# Patient Record
Sex: MALE | Race: WHITE | Employment: FULL TIME | URBAN - METROPOLITAN AREA
[De-identification: names, ages, dates, MRNs, and addresses within clinical notes are randomized per-mention and may not be internally consistent; named-entity substitution may affect disease eponyms.]

---
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
## 2018-02-15 ENCOUNTER — OFFICE VISIT (OUTPATIENT)
Dept: FAMILY MEDICINE | Facility: CLINIC | Age: 18
End: 2018-02-15
Payer: COMMERCIAL

## 2018-02-15 ENCOUNTER — RADIANT APPOINTMENT (OUTPATIENT)
Dept: GENERAL RADIOLOGY | Facility: CLINIC | Age: 18
End: 2018-02-15
Attending: FAMILY MEDICINE
Payer: COMMERCIAL

## 2018-02-15 VITALS
SYSTOLIC BLOOD PRESSURE: 110 MMHG | TEMPERATURE: 98.6 F | OXYGEN SATURATION: 99 % | WEIGHT: 163.8 LBS | HEART RATE: 86 BPM | DIASTOLIC BLOOD PRESSURE: 74 MMHG

## 2018-02-15 DIAGNOSIS — M79.671 RIGHT FOOT PAIN: Primary | ICD-10-CM

## 2018-02-15 PROCEDURE — 99214 OFFICE O/P EST MOD 30 MIN: CPT | Performed by: FAMILY MEDICINE

## 2018-02-15 PROCEDURE — 73630 X-RAY EXAM OF FOOT: CPT | Mod: RT

## 2018-02-15 NOTE — NURSING NOTE
"Chief Complaint   Patient presents with     Musculoskeletal Problem       Initial /74  Pulse 86  Temp 98.6  F (37  C) (Oral)  Wt 163 lb 12.8 oz (74.3 kg)  SpO2 99% Estimated body mass index is 17.47 kg/(m^2) as calculated from the following:    Height as of 9/10/12: 5' 5\" (1.651 m).    Weight as of 9/10/12: 105 lb (47.6 kg).  Medication Reconciliation: incomplete       Sariah Foster  CMA      "

## 2018-02-15 NOTE — MR AVS SNAPSHOT
"              After Visit Summary   2/15/2018    Taiwo Mart    MRN: [id number]           Patient Information     Date Of Birth          2000        Visit Information        Provider Department      2/15/2018 2:30 PM West Linares MD [facility]        Today's Diagnoses     Right foot pain    -  1       Follow-ups after your visit        Who to contact     If you have questions or need follow up information about today's clinic visit or your schedule please contact [facility] directly at [phone].  Normal or non-critical lab and imaging results will be communicated to you by MyChart, letter or phone within 4 business days after the clinic has received the results. If you do not hear from us within 7 days, please contact the clinic through [employer] or phone. If you have a critical or abnormal lab result, we will notify you by phone as soon as possible.  Submit refill requests through [employer] or call your pharmacy and they will forward the refill request to us. Please allow 3 business days for your refill to be completed.          Additional Information About Your Visit        MyChart Information     [employer] lets you send messages to your doctor, view your test results, renew your prescriptions, schedule appointments and more. To sign up, go to [email or web address] . Click on \"Log in\" on the left side of the screen, which will take you to the Welcome page. Then click on \"Sign up Now\" on the right side of the page.     You will be asked to enter the access code listed below, as well as some personal information. Please follow the directions to create your username and password.     Your access code is: [email or web address]  Expires: 3/14/2018  1:26 AM     Your access code will  in 90 days. If you need help or a new code, please call your [facility] or [phone].        Care EveryWhere ID     This is your Care EveryWhere ID. This could be used by other organizations " to access your [facility] medical records  [id number]        Your Vitals Were     Pulse Temperature Pulse Oximetry             86 98.6  F (37  C) (Oral) 99%          Blood Pressure from Last 3 Encounters:   02/15/18 110/74   12/14/17 135/72   09/10/12 116/62    Weight from Last 3 Encounters:   02/15/18 163 lb 12.8 oz (74.3 kg) (66 %)*   09/10/12 105 lb (47.6 kg) (40 %)*   09/07/12 105 lb (47.6 kg) (40 %)*     * Growth percentiles are based on [facility] 2-20 Years data.                 Today's Medication Changes          These changes are accurate as of 2/15/18  4:29 PM.  If you have any questions, ask your nurse or doctor.               Start taking these medicines.        Dose/Directions    amoxicillin-clavulanate 875-125 MG per tablet   Commonly known as:  AUGMENTIN   Used for:  Right foot pain   Started by:  West Linares MD        Dose:  1 tablet   Take 1 tablet by mouth 2 times daily   Quantity:  20 tablet   Refills:  0            Where to get your medicines      These medications were sent to [id number] [address] [address]    Hours:  Tech issues with their phone system Phone:  [phone]     amoxicillin-clavulanate 875-125 MG per tablet                Primary Care Provider Fax #    Physician No Ref-Primary [phone]       No address on file        Equal Access to Services     GAGE YBARRA AH: Hadii aad ku hadasho Soautumnali, waaxda luqadaha, qaybta kaalmada adeegyada, rocio cintron. So [facility] [phone].    ATENCIÓN: Si habla español, tiene a clay disposición servicios gratuitos de asistencia lingüística. Therese al [phone].    We comply with applicable federal civil rights laws and [place] laws. We do not discriminate on the basis of race, color, national origin, age, disability, sex, sexual orientation, or gender identity.            Thank you!     Thank you for choosing [facility]  for your  care. Our goal is always to provide you with excellent care. Hearing back from our patients is one way we can continue to improve our services. Please take a few minutes to complete the written survey that you may receive in the mail after your visit with us. Thank you!             Your Updated Medication List - Protect others around you: Learn how to safely use, store and throw away your medicines at www.disposemymeds.org.          This list is accurate as of 2/15/18  4:29 PM.  Always use your most recent med list.                   Brand Name Dispense Instructions for use Diagnosis    amoxicillin-clavulanate 875-125 MG per tablet    AUGMENTIN    20 tablet    Take 1 tablet by mouth 2 times daily    Right foot pain

## 2018-02-15 NOTE — PROGRESS NOTES
SUBJECTIVE:   Taiwo Mart is a 17 year old male who presents to clinic today for the following health issues:      Stepped on something outside Monday, not sure what. Feeling pain right foot today that is worse. .    She is very unsure if he stepped on something.  Pain though when he puts pressure on the heel.      OBJECTIVE:  Vital signs as noted above.  Appearance: cooperative.  Foot/ankle exam: soft tissue swelling and tenderness at the heel. . There is a slight breakdown of the skin at this area as described above  X-ray: no fracture or dislocation noted.  no foreign body.    ASSESSMENT:  foot Pain and swelling; differential does include skin infection possibly deep-seated if there is indeed a foreign body which cannot be seen at this time.; antibiotics. Soak.    If there is improvement with antibiotics return to your primary care within a week's time.    If continuing or increasing discomfort swelling in this area return within the next 48 hours.  At that time if it appears that this is more swollen and not responding    With the foot soaks and antibiotics I would anesthetize the area and I would open it up to see if there is any removal of pus or foreign body.    PLAN:  NSAID, ice suggested  See orders in EpicCare.  25 minutes in exam and counseling greater than 50% of the time in counseling in regards to the etiology of the potential infection or other reasons for swelling.    Also discussed his follow-up and soaking of the area.    Discussion regarding potential I&D of the area if not improving

## 2018-03-22 ENCOUNTER — OFFICE VISIT (OUTPATIENT)
Dept: FAMILY MEDICINE | Facility: CLINIC | Age: 18
End: 2018-03-22
Payer: COMMERCIAL

## 2018-03-22 VITALS
DIASTOLIC BLOOD PRESSURE: 72 MMHG | WEIGHT: 158 LBS | HEART RATE: 97 BPM | TEMPERATURE: 98.6 F | OXYGEN SATURATION: 97 % | SYSTOLIC BLOOD PRESSURE: 100 MMHG

## 2018-03-22 DIAGNOSIS — R07.0 THROAT PAIN: ICD-10-CM

## 2018-03-22 DIAGNOSIS — R50.9 FEVER IN ADULT: Primary | ICD-10-CM

## 2018-03-22 LAB
DEPRECATED S PYO AG THROAT QL EIA: NORMAL
FLUAV+FLUBV AG SPEC QL: NEGATIVE
FLUAV+FLUBV AG SPEC QL: NEGATIVE
HETEROPH AB SER QL: NEGATIVE
SPECIMEN SOURCE: NORMAL
SPECIMEN SOURCE: NORMAL

## 2018-03-22 PROCEDURE — 87804 INFLUENZA ASSAY W/OPTIC: CPT | Performed by: FAMILY MEDICINE

## 2018-03-22 PROCEDURE — 36415 COLL VENOUS BLD VENIPUNCTURE: CPT | Performed by: FAMILY MEDICINE

## 2018-03-22 PROCEDURE — 87081 CULTURE SCREEN ONLY: CPT | Performed by: FAMILY MEDICINE

## 2018-03-22 PROCEDURE — 86308 HETEROPHILE ANTIBODY SCREEN: CPT | Performed by: FAMILY MEDICINE

## 2018-03-22 PROCEDURE — 99214 OFFICE O/P EST MOD 30 MIN: CPT | Performed by: FAMILY MEDICINE

## 2018-03-22 PROCEDURE — 87880 STREP A ASSAY W/OPTIC: CPT | Performed by: FAMILY MEDICINE

## 2018-03-22 RX ORDER — MULTIPLE VITAMINS W/ MINERALS TAB 9MG-400MCG
1 TAB ORAL DAILY
COMMUNITY
End: 2023-07-06

## 2018-03-22 RX ORDER — AZITHROMYCIN 250 MG/1
TABLET, FILM COATED ORAL
Qty: 6 TABLET | Refills: 0 | Status: SHIPPED | OUTPATIENT
Start: 2018-03-22 | End: 2018-07-16

## 2018-03-22 RX ORDER — PREDNISONE 20 MG/1
40 TABLET ORAL DAILY
Qty: 10 TABLET | Refills: 0 | Status: SHIPPED | OUTPATIENT
Start: 2018-03-22 | End: 2018-03-27

## 2018-03-22 ASSESSMENT — ENCOUNTER SYMPTOMS
NEUROLOGICAL NEGATIVE: 1
CARDIOVASCULAR NEGATIVE: 1
SINUS PRESSURE: 1
ACTIVITY CHANGE: 1
ALLERGIC/IMMUNOLOGIC NEGATIVE: 1
COUGH: 1
GASTROINTESTINAL NEGATIVE: 1
MUSCULOSKELETAL NEGATIVE: 1
EYE ITCHING: 1
ENDOCRINE NEGATIVE: 1
SORE THROAT: 1
PSYCHIATRIC NEGATIVE: 1
APPETITE CHANGE: 1
CHILLS: 1
SHORTNESS OF BREATH: 1
RHINORRHEA: 1
FATIGUE: 1

## 2018-03-22 NOTE — MR AVS SNAPSHOT
"              After Visit Summary   3/22/2018    Taiwo Mart    MRN: [id number]           Patient Information     Date Of Birth          2000        Visit Information        Provider Department      3/22/2018 2:45 PM [place] MD [facility]        Today's Diagnoses     Fever in adult    -  1    Throat pain           Follow-ups after your visit        Who to contact     If you have questions or need follow up information about today's clinic visit or your schedule please contact [facility] directly at [phone].  Normal or non-critical lab and imaging results will be communicated to you by [email or web address], letter or phone within 4 business days after the clinic has received the results. If you do not hear from us within 7 days, please contact the clinic through [email or web address] or phone. If you have a critical or abnormal lab result, we will notify you by phone as soon as possible.  Submit refill requests through MediaTrove or call your pharmacy and they will forward the refill request to us. Please allow 3 business days for your refill to be completed.          Additional Information About Your Visit        MyChart Information     MediaTrove lets you send messages to your doctor, view your test results, renew your prescriptions, schedule appointments and more. To sign up, go to [email or web address] . Click on \"Log in\" on the left side of the screen, which will take you to the Welcome page. Then click on \"Sign up Now\" on the right side of the page.     You will be asked to enter the access code listed below, as well as some personal information. Please follow the directions to create your username and password.     Your access code is: [email or web address]  Expires: 2018 12:09 PM     Your access code will  in 90 days. If you need help or a new code, please call your [facility] or [phone].        Care EveryWhere ID     This is your Care EveryWhere ID. This could be used by " other organizations to access your [facility] medical records  [id number]        Your Vitals Were     Pulse Temperature Pulse Oximetry             97 98.6  F (37  C) (Oral) 97%          Blood Pressure from Last 3 Encounters:   03/22/18 100/72   02/15/18 110/74   12/14/17 135/72    Weight from Last 3 Encounters:   03/22/18 158 lb (71.7 kg) (57 %)*   02/15/18 163 lb 12.8 oz (74.3 kg) (66 %)*   09/10/12 105 lb (47.6 kg) (40 %)*     * Growth percentiles are based on [facility] 2-20 Years data.              We Performed the Following     Beta strep group A culture     Influenza A/B antigen     Mononucleosis screen     Rapid strep screen          Today's Medication Changes          These changes are accurate as of 3/22/18  7:28 PM.  If you have any questions, ask your nurse or doctor.               Start taking these medicines.        Dose/Directions    azithromycin 250 MG tablet   Commonly known as:  ZITHROMAX   Used for:  Throat pain, Fever in adult        Two tablets first day, then one tablet daily for four days.   Quantity:  6 tablet   Refills:  0       predniSONE 20 MG tablet   Commonly known as:  DELTASONE   Used for:  Fever in adult, Throat pain        Dose:  40 mg   Take 2 tablets (40 mg) by mouth daily for 5 days   Quantity:  10 tablet   Refills:  0            Where to get your medicines      These medications were sent to [facility] [address] [place] [address] [facility]  [id number] [place] [address]     Phone:  [phone]     azithromycin 250 MG tablet    predniSONE 20 MG tablet                Primary Care Provider Fax #    Physician No Ref-Primary [phone]       No address on file        Equal Access to Services     [facility]: Hadii aad ku hadasho Somodesto, waaxda luqadaha, qaybta kaalmada adeegyayomaira, rocio cintron. So [facility] [phone].    ATENCIÓN: Si habla español, tiene a clay disposición servicios gratuitos de asistencia lingüística. Llame al [phone].    We  comply with applicable federal civil rights laws and [place] laws. We do not discriminate on the basis of race, color, national origin, age, disability, sex, sexual orientation, or gender identity.            Thank you!     Thank you for choosing [facility]  for your care. Our goal is always to provide you with excellent care. Hearing back from our patients is one way we can continue to improve our services. Please take a few minutes to complete the written survey that you may receive in the mail after your visit with us. Thank you!             Your Updated Medication List - Protect others around you: Learn how to safely use, store and throw away your medicines at www.disposemymeds.org.          This list is accurate as of 3/22/18  7:28 PM.  Always use your most recent med list.                   Brand Name Dispense Instructions for use Diagnosis    amoxicillin-clavulanate 875-125 MG per tablet    AUGMENTIN    20 tablet    Take 1 tablet by mouth 2 times daily    Right foot pain       azithromycin 250 MG tablet    ZITHROMAX    6 tablet    Two tablets first day, then one tablet daily for four days.    Throat pain, Fever in adult       Multi-vitamin Tabs tablet      Take 1 tablet by mouth daily        predniSONE 20 MG tablet    DELTASONE    10 tablet    Take 2 tablets (40 mg) by mouth daily for 5 days    Fever in adult, Throat pain

## 2018-03-22 NOTE — PROGRESS NOTES
SUBJECTIVE:   Taiwo Mart is a 17 year old male presenting with a chief complaint of   Chief Complaint   Patient presents with     URI     Severe cough x3 days- fever, sore throat   side pain when cough.     Mucus and then vomiting. Sleeping a lot since he is not feeling well.  Fever; 103.4    He is an established patient of [facility].    Onset of symptoms was 7 day(s) ago.  Course of illness is worsening.    Severity moderate  Current and Associated symptoms: fever, chills, sweats, cough - productive, shortness of breath, sore throat, facial pain/pressure, headache and vomiting  Treatment measures tried include Tylenol/Ibuprofen.  Predisposing factors include exposure to strep, exposure to influenza and stodent.        Review of Systems   Constitutional: Positive for activity change, appetite change, chills and fatigue.   HENT: Positive for congestion, rhinorrhea, sinus pressure and sore throat.    Eyes: Positive for itching.   Respiratory: Positive for cough and shortness of breath.    Cardiovascular: Negative.    Gastrointestinal: Negative.    Endocrine: Negative.    Genitourinary: Negative.    Musculoskeletal: Negative.    Allergic/Immunologic: Negative.    Neurological: Negative.    Psychiatric/Behavioral: Negative.        No past medical history on file.  Family History   Problem Relation Age of Onset     Allergies Mother      Current Outpatient Prescriptions   Medication Sig Dispense Refill     multivitamin, therapeutic with minerals (MULTI-VITAMIN) TABS tablet Take 1 tablet by mouth daily       predniSONE (DELTASONE) 20 MG tablet Take 2 tablets (40 mg) by mouth daily for 5 days 10 tablet 0     azithromycin (ZITHROMAX) 250 MG tablet Two tablets first day, then one tablet daily for four days. 6 tablet 0     amoxicillin-clavulanate (AUGMENTIN) 875-125 MG per tablet Take 1 tablet by mouth 2 times daily (Patient not taking: Reported on 3/22/2018) 20 tablet 0     Social History   Substance Use Topics     Smoking  status: Current Every Day Smoker     Smokeless tobacco: Never Used     Alcohol use Not on file       OBJECTIVE  /72 (BP Location: Right arm, Patient Position: Chair, Cuff Size: Adult Regular)  Pulse 97  Temp 98.6  F (37  C) (Oral)  Wt 158 lb (71.7 kg)  SpO2 97%    Physical Exam   Constitutional: He is oriented to person, place, and time. He appears well-developed and well-nourished.   HENT:   Head: Normocephalic.   Inferior turbs enlarged , neck with positive adenopathy   Eyes: Pupils are equal, round, and reactive to light.   Neck: Normal range of motion. Neck supple.   Abdominal: Soft.   Musculoskeletal: Normal range of motion.   Lymphadenopathy:     He has cervical adenopathy.   Neurological: He is alert and oriented to person, place, and time.   Skin: Skin is warm.       Labs:  Results for orders placed or performed in visit on 03/22/18 (from the past 24 hour(s))   Rapid strep screen   Result Value Ref Range    Specimen Description Throat     Rapid Strep A Screen       NEGATIVE: No Group A streptococcal antigen detected by immunoassay, await culture report.   Influenza A/B antigen   Result Value Ref Range    Influenza A/B Agn Specimen Nasal     Influenza A Negative NEG^Negative    Influenza B Negative NEG^Negative   Mononucleosis screen   Result Value Ref Range    Mononucleosis Screen Negative NEG^Negative       X-Ray was not done.    ASSESSMENT:      ICD-10-CM    1. Fever in adult R50.9 Rapid strep screen     Influenza A/B antigen     Beta strep group A culture     predniSONE (DELTASONE) 20 MG tablet     azithromycin (ZITHROMAX) 250 MG tablet   2. Throat pain R07.0 Rapid strep screen     Influenza A/B antigen     Mononucleosis screen     Beta strep group A culture     predniSONE (DELTASONE) 20 MG tablet     azithromycin (ZITHROMAX) 250 MG tablet    3. sinusitis    Medical Decision Making:    Differential Diagnosis:  URI Adult/Peds:  Bronchitis-viral, Croup, Laryngitis, Pneumonia, Sinusitis, Strep  pharyngitis, Viral pharyngitis, Viral syndrome and Viral upper respiratory illness    Serious Comorbid Conditions:  Peds:  student with exposure    PLAN:    URI Adult:  Tylenol, Ibuprofen and zithro and prednisone      Followup:    If not improving or if condition worsens, follow up with your Primary Care Provider    There are no Patient Instructions on file for this visit.

## 2018-03-22 NOTE — LETTER
[facility]  [address] [place] [id number]  Phone: [phone]    March 22, 2018        Taiwo Mart  [address] [facility] [address]          To whom it may concern:    RE: Taiwo Mart    Patient was seen and treated today at our clinic and missed work and school. May return on 2/26/18.    Please contact me for questions or concerns.      Sincerely,        [place] Lianres MD

## 2018-03-23 LAB
BACTERIA SPEC CULT: NORMAL
SPECIMEN SOURCE: NORMAL

## 2018-07-16 ENCOUNTER — OFFICE VISIT (OUTPATIENT)
Dept: FAMILY MEDICINE | Facility: CLINIC | Age: 18
End: 2018-07-16
Payer: COMMERCIAL

## 2018-07-16 VITALS
RESPIRATION RATE: 16 BRPM | HEIGHT: 69 IN | OXYGEN SATURATION: 98 % | BODY MASS INDEX: 22.81 KG/M2 | WEIGHT: 154 LBS | HEART RATE: 64 BPM | TEMPERATURE: 97.8 F | SYSTOLIC BLOOD PRESSURE: 110 MMHG | DIASTOLIC BLOOD PRESSURE: 60 MMHG

## 2018-07-16 DIAGNOSIS — H10.33 ACUTE BACTERIAL CONJUNCTIVITIS OF BOTH EYES: Primary | ICD-10-CM

## 2018-07-16 PROCEDURE — 99213 OFFICE O/P EST LOW 20 MIN: CPT | Performed by: NURSE PRACTITIONER

## 2018-07-16 RX ORDER — POLYMYXIN B SULFATE AND TRIMETHOPRIM 1; 10000 MG/ML; [USP'U]/ML
1 SOLUTION OPHTHALMIC
Qty: 1 BOTTLE | Refills: 0 | Status: SHIPPED | OUTPATIENT
Start: 2018-07-16 | End: 2018-07-23

## 2018-07-16 NOTE — PROGRESS NOTES
"  SUBJECTIVE:   Taiwo Mart is a 17 year old male who presents to clinic today for the following health issues:      Eye(s) Problem  Onset: last Monday     Description:   Location: bilateral  Pain: YES  Redness: YES    Accompanying Signs & Symptoms:  Discharge/mattering: YES  Swelling: YES  Visual changes: no  Fever: no  Nasal Congestion: no  Bothered by bright lights: YES    History:   Trauma: no   Foreign body exposure: no    Precipitating factors:   Wearing contacts: no    Alleviating factors:  Improved by:     Therapies Tried and outcome: none   Bilateral eye redness for the past week. Told it was viral and is concerned because redness and discharge is worsening. No history of recurrent eye infections. No use of contacts. Eyes matted shut in the morning.       Problem list and histories reviewed & adjusted, as indicated.  Additional history: none    There is no problem list on file for this patient.    History reviewed. No pertinent surgical history.    Social History   Substance Use Topics     Smoking status: Former Smoker     Smokeless tobacco: Never Used     Alcohol use No     Family History   Problem Relation Age of Onset     Allergies Mother            Reviewed and updated as needed this visit by clinical staff  Tobacco  Allergies  Meds  Problems  Med Hx  Surg Hx  Fam Hx  Soc Hx        Reviewed and updated as needed this visit by Provider  Allergies  Meds  Problems  Med Hx  Surg Hx  Fam Hx         ROS:  Constitutional, HEENT, cardiovascular, pulmonary, gi and gu systems are negative, except as otherwise noted.    OBJECTIVE:     /60 (BP Location: Right arm, Patient Position: Chair, Cuff Size: Adult Large)  Pulse 64  Temp 97.8  F (36.6  C) (Oral)  Resp 16  Ht 5' 8.5\" (1.74 m)  Wt 154 lb (69.9 kg)  SpO2 98%  BMI 23.08 kg/m2  Body mass index is 23.08 kg/(m^2).  GENERAL: healthy, alert and no distress  EYES: erythematous conjunctiva  HENT: ear canals and TM's normal, nose and mouth " without ulcers or lesions  NECK: no adenopathy, no asymmetry, masses, or scars and thyroid normal to palpation  RESP: lungs clear to auscultation - no rales, rhonchi or wheezes  CV: regular rate and rhythm, normal S1 S2, no S3 or S4, no murmur, click or rub, no peripheral edema and peripheral pulses strong  PSYCH: mentation appears normal, affect normal/bright        ASSESSMENT/PLAN:             1. Acute bacterial conjunctivitis of both eyes  Possibly still viral but will send polytrim to be used. Encouraged good hand hygiene and avoiding tip of dropper to touch the eye.   - trimethoprim-polymyxin b (POLYTRIM) ophthalmic solution; Apply 1 drop to eye every 3 hours for 7 days  Dispense: 1 Bottle; Refill: 0        Sally Boyce NP  [facility]

## 2018-07-16 NOTE — MR AVS SNAPSHOT
"              After Visit Summary   2018    Taiwo Mart    MRN: [id number]           Patient Information     Date Of Birth          2000        Visit Information        Provider Department      2018 2:45 PM Sally Boyce NP [facility]        Today's Diagnoses     Acute bacterial conjunctivitis of both eyes    -  1       Follow-ups after your visit        Who to contact     If you have questions or need follow up information about today's clinic visit or your schedule please contact [facility] directly at [phone].  Normal or non-critical lab and imaging results will be communicated to you by [place] Powerhart, letter or phone within 4 business days after the clinic has received the results. If you do not hear from us within 7 days, please contact the clinic through [place] Powerhart or phone. If you have a critical or abnormal lab result, we will notify you by phone as soon as possible.  Submit refill requests through Bugcrowd or call your pharmacy and they will forward the refill request to us. Please allow 3 business days for your refill to be completed.          Additional Information About Your Visit        MyChart Information     Bugcrowd lets you send messages to your doctor, view your test results, renew your prescriptions, schedule appointments and more. To sign up, go to [email or web address] . Click on \"Log in\" on the left side of the screen, which will take you to the Welcome page. Then click on \"Sign up Now\" on the right side of the page.     You will be asked to enter the access code listed below, as well as some personal information. Please follow the directions to create your username and password.     Your access code is: [email or web address]  Expires: 10/14/2018  3:11 PM     Your access code will  in 90 days. If you need help or a new code, please call your [facility] or [phone].        Care EveryWhere ID     This is your Care EveryWhere ID. This could be " "used by other organizations to access your [facility] medical records  [id number]        Your Vitals Were     Pulse Temperature Respirations Height Pulse Oximetry BMI (Body Mass Index)    64 97.8  F (36.6  C) (Oral) 16 5' 8.5\" (1.74 m) 98% 23.08 kg/m2       Blood Pressure from Last 3 Encounters:   07/16/18 110/60   03/22/18 100/72   02/15/18 110/74    Weight from Last 3 Encounters:   07/16/18 154 lb (69.9 kg) (49 %)*   03/22/18 158 lb (71.7 kg) (57 %)*   02/15/18 163 lb 12.8 oz (74.3 kg) (66 %)*     * Growth percentiles are based on [facility] 2-20 Years data.              Today, you had the following     No orders found for display         Today's Medication Changes          These changes are accurate as of 7/16/18  3:11 PM.  If you have any questions, ask your nurse or doctor.               Start taking these medicines.        Dose/Directions    trimethoprim-polymyxin b ophthalmic solution   Commonly known as:  POLYTRIM   Used for:  Acute bacterial conjunctivitis of both eyes   Started by:  Sally Boyce, JOSE        Dose:  1 drop   Apply 1 drop to eye every 3 hours for 7 days   Quantity:  1 Bottle   Refills:  0            Where to get your medicines      These medications were sent to [place] [address] [place] [address] [address]    Hours:  Tech issues with their phone system Phone:  [phone]     trimethoprim-polymyxin b ophthalmic solution                Primary Care Provider Fax #    Physician No Ref-Primary [phone]       No address on file        Equal Access to Services     [facility]: Hadii curt fitzgerald hadasho Soomaali, waaxda luqadaha, qaybta kaalmada adeegyada, rocio cintron. So [facility] [phone].    ATENCIÓN: Si habla español, tiene a clay disposición servicios gratuitos de asistencia lingüística. Llame al [phone].    We comply with applicable federal civil rights laws and [place] laws. We do not discriminate on the " basis of race, color, national origin, age, disability, sex, sexual orientation, or gender identity.            Thank you!     Thank you for choosing [facility]  for your care. Our goal is always to provide you with excellent care. Hearing back from our patients is one way we can continue to improve our services. Please take a few minutes to complete the written survey that you may receive in the mail after your visit with us. Thank you!             Your Updated Medication List - Protect others around you: Learn how to safely use, store and throw away your medicines at www.disposemymeds.org.          This list is accurate as of 7/16/18  3:11 PM.  Always use your most recent med list.                   Brand Name Dispense Instructions for use Diagnosis    Multi-vitamin Tabs tablet      Take 1 tablet by mouth daily        trimethoprim-polymyxin b ophthalmic solution    POLYTRIM    1 Bottle    Apply 1 drop to eye every 3 hours for 7 days    Acute bacterial conjunctivitis of both eyes

## 2018-07-28 ENCOUNTER — RADIANT APPOINTMENT (OUTPATIENT)
Dept: GENERAL RADIOLOGY | Facility: CLINIC | Age: 18
End: 2018-07-28
Attending: FAMILY MEDICINE
Payer: COMMERCIAL

## 2018-07-28 ENCOUNTER — OFFICE VISIT (OUTPATIENT)
Dept: URGENT CARE | Facility: URGENT CARE | Age: 18
End: 2018-07-28
Payer: COMMERCIAL

## 2018-07-28 VITALS
TEMPERATURE: 97.7 F | HEART RATE: 87 BPM | BODY MASS INDEX: 23.06 KG/M2 | SYSTOLIC BLOOD PRESSURE: 102 MMHG | DIASTOLIC BLOOD PRESSURE: 70 MMHG | WEIGHT: 153.9 LBS

## 2018-07-28 DIAGNOSIS — M79.644 PAIN OF FINGER OF RIGHT HAND: Primary | ICD-10-CM

## 2018-07-28 DIAGNOSIS — F41.9 ANXIETY: ICD-10-CM

## 2018-07-28 DIAGNOSIS — M79.644 PAIN OF FINGER OF RIGHT HAND: ICD-10-CM

## 2018-07-28 PROCEDURE — 73130 X-RAY EXAM OF HAND: CPT | Mod: RT

## 2018-07-28 PROCEDURE — 99214 OFFICE O/P EST MOD 30 MIN: CPT | Performed by: FAMILY MEDICINE

## 2018-07-28 RX ORDER — CITALOPRAM HYDROBROMIDE 20 MG/1
TABLET ORAL
Qty: 30 TABLET | Refills: 0 | Status: SHIPPED | OUTPATIENT
Start: 2018-07-28 | End: 2018-08-13

## 2018-07-28 RX ORDER — CEPHALEXIN 500 MG/1
500 CAPSULE ORAL 3 TIMES DAILY
Qty: 30 CAPSULE | Refills: 0 | Status: SHIPPED | OUTPATIENT
Start: 2018-07-28 | End: 2018-08-13

## 2018-07-28 ASSESSMENT — ANXIETY QUESTIONNAIRES
2. NOT BEING ABLE TO STOP OR CONTROL WORRYING: MORE THAN HALF THE DAYS
7. FEELING AFRAID AS IF SOMETHING AWFUL MIGHT HAPPEN: SEVERAL DAYS
5. BEING SO RESTLESS THAT IT IS HARD TO SIT STILL: NOT AT ALL
3. WORRYING TOO MUCH ABOUT DIFFERENT THINGS: MORE THAN HALF THE DAYS
GAD7 TOTAL SCORE: 10
1. FEELING NERVOUS, ANXIOUS, OR ON EDGE: NEARLY EVERY DAY
6. BECOMING EASILY ANNOYED OR IRRITABLE: SEVERAL DAYS
IF YOU CHECKED OFF ANY PROBLEMS ON THIS QUESTIONNAIRE, HOW DIFFICULT HAVE THESE PROBLEMS MADE IT FOR YOU TO DO YOUR WORK, TAKE CARE OF THINGS AT HOME, OR GET ALONG WITH OTHER PEOPLE: VERY DIFFICULT

## 2018-07-28 ASSESSMENT — ENCOUNTER SYMPTOMS: AGITATION: 1

## 2018-07-28 ASSESSMENT — PATIENT HEALTH QUESTIONNAIRE - PHQ9: 5. POOR APPETITE OR OVEREATING: SEVERAL DAYS

## 2018-07-28 NOTE — PROGRESS NOTES
SUBJECTIVE:   Taiwo Mart is a 17 year old male presenting with a chief complaint of   Chief Complaint   Patient presents with     Musculoskeletal Problem     injures right hand now swollen which doesnt have enough movement       Taiwo Mart is a 17 year old male who sustained a right hand injury 2 days ago. Mechanism of injury: hit something. Immediate symptoms: immediate swelling. Symptoms have been sudden since that time. Prior history of related problems: no prior problems with this area in the past.  In addition he has    Some behavior problems as can be evident above as well as mother is concerned of his anxiety possibly some depression since the death of friends on [address] a couple of years .  Would like to start on antidepressant medication and follow-up with her primary care within the next 2 weeks.    No suicidal ideations.    .    He is an established patient of [facility].        Review of Systems   Musculoskeletal:        Rt hand pain   Psychiatric/Behavioral: Positive for agitation.       History reviewed. No pertinent past medical history.  Family History   Problem Relation Age of Onset     Allergies Mother      Current Outpatient Prescriptions   Medication Sig Dispense Refill     cephALEXin (KEFLEX) 500 MG capsule Take 1 capsule (500 mg) by mouth 3 times daily 30 capsule 0     citalopram (CELEXA) 20 MG tablet Take 1/2 tablet (10 mg) for 1-2 weeks, then increase to 1 tablet orally daily 30 tablet 0     multivitamin, therapeutic with minerals (MULTI-VITAMIN) TABS tablet Take 1 tablet by mouth daily       Social History   Substance Use Topics     Smoking status: Former Smoker     Smokeless tobacco: Never Used     Alcohol use No       OBJECTIVE  /70 (BP Location: Right arm, Patient Position: Chair, Cuff Size: Adult Regular)  Pulse 87  Temp 97.7  F (36.5  C) (Oral)  Wt 153 lb 14.4 oz (69.8 kg)  BMI 23.06 kg/m2    Physical Exam    Labs:  No results found for this or any previous visit (from the  past 24 hour(s)).    X-Ray was done, my findings are: no evidence of fracture.    Current Outpatient Prescriptions   Medication Sig Dispense Refill     cephALEXin (KEFLEX) 500 MG capsule Take 1 capsule (500 mg) by mouth 3 times daily 30 capsule 0     citalopram (CELEXA) 20 MG tablet Take 1/2 tablet (10 mg) for 1-2 weeks, then increase to 1 tablet orally daily 30 tablet 0     multivitamin, therapeutic with minerals (MULTI-VITAMIN) TABS tablet Take 1 tablet by mouth daily           ASSESSMENT:      ICD-10-CM    1. Pain of finger of right hand M79.644 XR Hand Right G/E 3 Views     cephALEXin (KEFLEX) 500 MG capsule   2. Anxiety F41.9 citalopram (CELEXA) 20 MG tablet        Pain is from him hitting an object.  X-ray shows no evidence of fracture no obvious he was having some anger issues when this happened.  Do agree with mother that he probably should be started on some type of medication.    Also reiterated he does need close follow-up with his primary care    As previously stated no suicidal ideations.  Does live at home.    Will be followed up closely by his mother and other parents splint to the hand.  Nonsteroidal medication daily    Ice to the area twice a day    .  If still having pain within the next 7 days should be seen    .        Medical Decision Making:    Differential Diagnosis:  Depression, bipolar, anxiety    Contusion, fracture    Serious Comorbid Conditions:  Peds:  None    PLAN:    1.  Citalopram 20 mg.  One half tablet p.o. daily for 2 weeks and then 1 tablet p.o. daily for 2 weeks.  Starter pack needs follow-up with primary care    2.  Keflex; this for the abrasion that is over the hand and joints of the affected hand.  Broken the skin quickly to secondary infection    Followup:    We will be following up the next 2 weeks with primary care    There are no Patient Instructions on file for this visit.

## 2018-07-28 NOTE — MR AVS SNAPSHOT
"              After Visit Summary   7/28/2018    Taiwo Mart    MRN: [id number]           Patient Information     Date Of Birth          2000        Visit Information        Provider Department      7/28/2018 12:55 PM West Linares MD [facility]        Today's Diagnoses     Pain of finger of right hand    -  1    Anxiety           Follow-ups after your visit        Your next 10 appointments already scheduled     Aug 13, 2018 10:00 AM CDT   SHORT with Ramona Ann Aaseby-Aguilera, PA-C   [facility]    [address] [facility] [address] [phone]              Who to contact     If you have questions or need follow up information about today's clinic visit or your schedule please contact [facility] directly at [phone].  Normal or non-critical lab and imaging results will be communicated to you by MyChart, letter or phone within 4 business days after the clinic has received the results. If you do not hear from us within 7 days, please contact the clinic through MyChart or phone. If you have a critical or abnormal lab result, we will notify you by phone as soon as possible.  Submit refill requests through [employer] or call your pharmacy and they will forward the refill request to us. Please allow 3 business days for your refill to be completed.          Additional Information About Your Visit        MyChart Information     [employer] lets you send messages to your doctor, view your test results, renew your prescriptions, schedule appointments and more. To sign up, go to [email or web address] . Click on \"Log in\" on the left side of the screen, which will take you to the Welcome page. Then click on \"Sign up Now\" on the right side of the page.     You will be asked to enter the access code listed below, as well as some personal information. Please follow the directions to create your username and password.   "   Your access code is: [email or web address]  Expires: 10/14/2018  3:11 PM     Your access code will  in 90 days. If you need help or a new code, please call your [facility] or [phone].        Care EveryWhere ID     This is your Care EveryWhere ID. This could be used by other organizations to access your [facility] medical records  [id number]        Your Vitals Were     Pulse Temperature BMI (Body Mass Index)             87 97.7  F (36.5  C) (Oral) 23.06 kg/m2          Blood Pressure from Last 3 Encounters:   18 102/70   18 110/60   18 100/72    Weight from Last 3 Encounters:   18 153 lb 14.4 oz (69.8 kg) (48 %)*   18 154 lb (69.9 kg) (49 %)*   18 158 lb (71.7 kg) (57 %)*     * Growth percentiles are based on [facility] 2-20 Years data.                 Today's Medication Changes          These changes are accurate as of 18 11:59 PM.  If you have any questions, ask your nurse or doctor.               Start taking these medicines.        Dose/Directions    cephALEXin 500 MG capsule   Commonly known as:  KEFLEX   Used for:  Pain of finger of right hand   Started by:  West Linares MD        Dose:  500 mg   Take 1 capsule (500 mg) by mouth 3 times daily   Quantity:  30 capsule   Refills:  0       citalopram 20 MG tablet   Commonly known as:  celeXA   Used for:  Anxiety   Started by:  West Linares MD        Take 1/2 tablet (10 mg) for 1-2 weeks, then increase to 1 tablet orally daily   Quantity:  30 tablet   Refills:  0            Where to get your medicines      These medications were sent to [facility] [address] [place] [address] [facility]  [address] [facility] [address]     Phone:  [phone]     cephALEXin 500 MG capsule    citalopram 20 MG tablet                Primary Care Provider Office Phone # Fax #    Sally CITLALI Boyce [phone]       [facility] [address] [address] [facility] [address]        Equal Access to Services      GAGE YBARRA : Hadii aad ku pablo Somodesto, waaxda luqadaha, qaybta kaalmada adeclaribel, rocio nmiesh haymendel whitesidecharlottemarco a ballard . So [facility] [phone].    ATENCIÓN: Si habla español, tiene a clay disposición servicios gratuitos de asistencia lingüística. Llame al [phone].    We comply with applicable federal civil rights laws and [place] laws. We do not discriminate on the basis of race, color, national origin, age, disability, sex, sexual orientation, or gender identity.            Thank you!     Thank you for choosing [facility]  for your care. Our goal is always to provide you with excellent care. Hearing back from our patients is one way we can continue to improve our services. Please take a few minutes to complete the written survey that you may receive in the mail after your visit with us. Thank you!             Your Updated Medication List - Protect others around you: Learn how to safely use, store and throw away your medicines at www.disposemymeds.org.          This list is accurate as of 7/28/18 11:59 PM.  Always use your most recent med list.                   Brand Name Dispense Instructions for use Diagnosis    cephALEXin 500 MG capsule    KEFLEX    30 capsule    Take 1 capsule (500 mg) by mouth 3 times daily    Pain of finger of right hand       citalopram 20 MG tablet    celeXA    30 tablet    Take 1/2 tablet (10 mg) for 1-2 weeks, then increase to 1 tablet orally daily    Anxiety       Multi-vitamin Tabs tablet      Take 1 tablet by mouth daily

## 2018-07-29 ASSESSMENT — ANXIETY QUESTIONNAIRES: GAD7 TOTAL SCORE: 10

## 2018-07-29 ASSESSMENT — PATIENT HEALTH QUESTIONNAIRE - PHQ9: SUM OF ALL RESPONSES TO PHQ QUESTIONS 1-9: 12

## 2018-07-30 ENCOUNTER — HEALTH MAINTENANCE LETTER (OUTPATIENT)
Age: 18
End: 2018-07-30

## 2018-08-13 ENCOUNTER — OFFICE VISIT (OUTPATIENT)
Dept: FAMILY MEDICINE | Facility: CLINIC | Age: 18
End: 2018-08-13
Payer: COMMERCIAL

## 2018-08-13 VITALS
TEMPERATURE: 98.5 F | DIASTOLIC BLOOD PRESSURE: 58 MMHG | WEIGHT: 150.9 LBS | BODY MASS INDEX: 22.35 KG/M2 | OXYGEN SATURATION: 99 % | SYSTOLIC BLOOD PRESSURE: 119 MMHG | HEART RATE: 79 BPM | HEIGHT: 69 IN

## 2018-08-13 DIAGNOSIS — F41.9 ANXIETY: ICD-10-CM

## 2018-08-13 PROCEDURE — 99214 OFFICE O/P EST MOD 30 MIN: CPT | Performed by: PHYSICIAN ASSISTANT

## 2018-08-13 RX ORDER — CITALOPRAM HYDROBROMIDE 20 MG/1
TABLET ORAL
Qty: 30 TABLET | Refills: 1 | Status: SHIPPED | OUTPATIENT
Start: 2018-08-13 | End: 2019-05-20

## 2018-08-13 ASSESSMENT — ANXIETY QUESTIONNAIRES
7. FEELING AFRAID AS IF SOMETHING AWFUL MIGHT HAPPEN: MORE THAN HALF THE DAYS
GAD7 TOTAL SCORE: 12
1. FEELING NERVOUS, ANXIOUS, OR ON EDGE: MORE THAN HALF THE DAYS
6. BECOMING EASILY ANNOYED OR IRRITABLE: MORE THAN HALF THE DAYS
IF YOU CHECKED OFF ANY PROBLEMS ON THIS QUESTIONNAIRE, HOW DIFFICULT HAVE THESE PROBLEMS MADE IT FOR YOU TO DO YOUR WORK, TAKE CARE OF THINGS AT HOME, OR GET ALONG WITH OTHER PEOPLE: SOMEWHAT DIFFICULT
5. BEING SO RESTLESS THAT IT IS HARD TO SIT STILL: SEVERAL DAYS
2. NOT BEING ABLE TO STOP OR CONTROL WORRYING: MORE THAN HALF THE DAYS
3. WORRYING TOO MUCH ABOUT DIFFERENT THINGS: MORE THAN HALF THE DAYS

## 2018-08-13 ASSESSMENT — PATIENT HEALTH QUESTIONNAIRE - PHQ9: 5. POOR APPETITE OR OVEREATING: SEVERAL DAYS

## 2018-08-13 NOTE — MR AVS SNAPSHOT
"              After Visit Summary   8/13/2018    Taiwo Mart    MRN: [id number]           Patient Information     Date Of Birth          2000        Visit Information        Provider Department      8/13/2018 10:00 AM Aaseby-Aguilera, Ramona Ann, PA-C [facility]        Today's Diagnoses     Anxiety          Care Instructions    (F41.9) Anxiety  Comment:   Plan: citalopram (CELEXA) 20 MG tablet            Please continue to take daily and follow-up in 6 weeks.           Follow-ups after your visit        Follow-up notes from your care team     Return in about 6 weeks (around 9/24/2018) for Routine Visit.      Who to contact     If you have questions or need follow up information about today's clinic visit or your schedule please contact [facility] directly at [phone].  Normal or non-critical lab and imaging results will be communicated to you by [employer], letter or phone within 4 business days after the clinic has received the results. If you do not hear from us within 7 days, please contact the clinic through [employer] or phone. If you have a critical or abnormal lab result, we will notify you by phone as soon as possible.  Submit refill requests through [email or web address] or call your pharmacy and they will forward the refill request to us. Please allow 3 business days for your refill to be completed.          Additional Information About Your Visit        MyChart Information     [email or web address] lets you send messages to your doctor, view your test results, renew your prescriptions, schedule appointments and more. To sign up, go to [email or web address] . Click on \"Log in\" on the left side of the screen, which will take you to the Welcome page. Then click on \"Sign up Now\" on the right side of the page.     You will be asked to enter the access code listed below, as well as some personal information. Please follow the directions to create your username and password.     Your access code is: " "[email or web address]  Expires: 10/14/2018  3:11 PM     Your access code will  in 90 days. If you need help or a new code, please call your [facility] clinic or [phone].        Care EveryWhere ID     This is your Care EveryWhere ID. This could be used by other organizations to access your [facility] medical records  [id number]        Your Vitals Were     Pulse Temperature Height Pulse Oximetry BMI (Body Mass Index)       79 98.5  F (36.9  C) (Oral) 5' 8.5\" (1.74 m) 99% 22.61 kg/m2        Blood Pressure from Last 3 Encounters:   18 119/58   18 102/70   18 110/60    Weight from Last 3 Encounters:   18 150 lb 14.4 oz (68.4 kg) (43 %)*   18 153 lb 14.4 oz (69.8 kg) (48 %)*   18 154 lb (69.9 kg) (49 %)*     * Growth percentiles are based on [facility] 2-20 Years data.              Today, you had the following     No orders found for display         Today's Medication Changes          These changes are accurate as of 18 10:31 AM.  If you have any questions, ask your nurse or doctor.               These medicines have changed or have updated prescriptions.        Dose/Directions    citalopram 20 MG tablet   Commonly known as:  celeXA   This may have changed:  additional instructions   Used for:  Anxiety   Changed by:  Aaseby-Aguilera, Ramona Ann, PA-C        Take  1 tablet orally daily   Quantity:  30 tablet   Refills:  1         Stop taking these medicines if you haven't already. Please contact your care team if you have questions.     cephALEXin 500 MG capsule   Commonly known as:  KEFLEX   Stopped by:  Aaseby-Aguilera, Ramona Ann, PA-C                Where to get your medicines      These medications were sent to [facility] [address] [place] [address] [facility]  [address] [facility] [place] [address]     Phone:  [phone]     citalopram 20 MG tablet                Primary Care Provider Office Phone # Fax #    Sally Boyce [phone]       [facility] [place] [address]  [facility] [address]        Equal Access to Services     GAGE YBARRA : Hadii curt fitzgerald hadmarquiseo Soautumnali, waaxda luqadaha, qaybta kaalmada yordanmaria isabelyomaira, rocio beavers dailauren whitesidecharlottemarco a cintron. So [facility] [phone].    ATENCIÓN: Si habla español, tiene a clay disposición servicios gratuitos de asistencia lingüística. Llame al [phone].    We comply with applicable federal civil rights laws and [place] laws. We do not discriminate on the basis of race, color, national origin, age, disability, sex, sexual orientation, or gender identity.            Thank you!     Thank you for choosing [facility]  for your care. Our goal is always to provide you with excellent care. Hearing back from our patients is one way we can continue to improve our services. Please take a few minutes to complete the written survey that you may receive in the mail after your visit with us. Thank you!             Your Updated Medication List - Protect others around you: Learn how to safely use, store and throw away your medicines at www.disposemymeds.org.          This list is accurate as of 8/13/18 10:31 AM.  Always use your most recent med list.                   Brand Name Dispense Instructions for use Diagnosis    citalopram 20 MG tablet    celeXA    30 tablet    Take  1 tablet orally daily    Anxiety       Multi-vitamin Tabs tablet      Take 1 tablet by mouth daily
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
## 2018-08-13 NOTE — PATIENT INSTRUCTIONS
(F41.9) Anxiety  Comment:   Plan: citalopram (CELEXA) 20 MG tablet            Please continue to take daily and follow-up in 6 weeks.

## 2018-08-14 ASSESSMENT — PATIENT HEALTH QUESTIONNAIRE - PHQ9: SUM OF ALL RESPONSES TO PHQ QUESTIONS 1-9: 8

## 2018-08-14 ASSESSMENT — ANXIETY QUESTIONNAIRES: GAD7 TOTAL SCORE: 12

## 2018-11-15 PROBLEM — F41.9 ANXIETY: Status: ACTIVE | Noted: 2018-11-15

## 2019-05-09 ENCOUNTER — OFFICE VISIT (OUTPATIENT)
Dept: INTERNAL MEDICINE | Facility: CLINIC | Age: 19
End: 2019-05-09
Payer: COMMERCIAL

## 2019-05-09 VITALS
HEIGHT: 69 IN | OXYGEN SATURATION: 100 % | HEART RATE: 95 BPM | DIASTOLIC BLOOD PRESSURE: 56 MMHG | BODY MASS INDEX: 23.65 KG/M2 | RESPIRATION RATE: 20 BRPM | SYSTOLIC BLOOD PRESSURE: 90 MMHG | WEIGHT: 159.7 LBS

## 2019-05-09 DIAGNOSIS — F17.200 TOBACCO USE DISORDER: Primary | ICD-10-CM

## 2019-05-09 PROCEDURE — 99213 OFFICE O/P EST LOW 20 MIN: CPT | Performed by: FAMILY MEDICINE

## 2019-05-09 RX ORDER — VARENICLINE TARTRATE 1 MG/1
1 TABLET, FILM COATED ORAL 2 TIMES DAILY
Qty: 60 TABLET | Refills: 2 | Status: SHIPPED | OUTPATIENT
Start: 2019-05-09 | End: 2020-12-10

## 2019-05-09 RX ORDER — VARENICLINE TARTRATE 1 MG/1
1 TABLET, FILM COATED ORAL 2 TIMES DAILY
Qty: 60 TABLET | Refills: 2 | Status: SHIPPED | OUTPATIENT
Start: 2019-05-09 | End: 2019-05-09

## 2019-05-09 ASSESSMENT — MIFFLIN-ST. JEOR: SCORE: 1724.77

## 2019-05-09 NOTE — PROGRESS NOTES
"  SUBJECTIVE:   Taiwo Mart is a 18 year old male who presents to clinic today for the following     Smoking      HISTORY    He is a 1/2 to 1 pack/day smoker since about age 13.  He desires to stop at this time.  He has previously tried some nicotine gum and was unsuccessful.  He has heard about this medication from his parents and their experience was favorable.    Patient has a past history of depression and was on citalopram for a short while.  He is presently coping well in his opinion without medication, having made a few life changes.      Patient Active Problem List   Diagnosis     Anxiety     Tobacco use disorder       REVIEW OF SYSTEMS    No SOB or cough.  No chest pain.  No abdominal pain.  No lethargy or SI.      SOCIAL HISTORY    He is doing sheet-metal work 3 days a week.  He also intends college, getting his general studies done.  He also does quite a bit of working out physically.      No past medical history on file.       EXAM  BP 90/56 (BP Location: Right arm, Patient Position: Sitting, Cuff Size: Adult Large)   Pulse 95   Resp 20   Ht 1.753 m (5' 9\")   Wt 72.4 kg (159 lb 11.2 oz)   SpO2 100%   BMI 23.58 kg/m        (F17.200) Tobacco use disorder  (primary encounter diagnosis)  Comment:   Medication and instructions discussed.  Plan: varenicline (CHANTIX CHANDAN) 0.5 MG X 11 & 1 MG X         42 tablet, varenicline (CHANTIX) 1 MG tablet,         DISCONTINUED: varenicline (CHANTIX) 1 MG tablet                "

## 2019-05-20 ENCOUNTER — ANCILLARY PROCEDURE (OUTPATIENT)
Dept: GENERAL RADIOLOGY | Facility: CLINIC | Age: 19
End: 2019-05-20
Attending: INTERNAL MEDICINE
Payer: COMMERCIAL

## 2019-05-20 ENCOUNTER — OFFICE VISIT (OUTPATIENT)
Dept: INTERNAL MEDICINE | Facility: CLINIC | Age: 19
End: 2019-05-20
Payer: COMMERCIAL

## 2019-05-20 VITALS
BODY MASS INDEX: 23.3 KG/M2 | DIASTOLIC BLOOD PRESSURE: 60 MMHG | SYSTOLIC BLOOD PRESSURE: 122 MMHG | TEMPERATURE: 98.7 F | HEART RATE: 88 BPM | WEIGHT: 157.3 LBS | HEIGHT: 69 IN | RESPIRATION RATE: 16 BRPM | OXYGEN SATURATION: 99 %

## 2019-05-20 DIAGNOSIS — M25.531 RIGHT WRIST PAIN: ICD-10-CM

## 2019-05-20 DIAGNOSIS — S69.91XA WRIST INJURY, RIGHT, INITIAL ENCOUNTER: ICD-10-CM

## 2019-05-20 DIAGNOSIS — M25.531 RIGHT WRIST PAIN: Primary | ICD-10-CM

## 2019-05-20 PROCEDURE — 99213 OFFICE O/P EST LOW 20 MIN: CPT | Performed by: INTERNAL MEDICINE

## 2019-05-20 PROCEDURE — 73110 X-RAY EXAM OF WRIST: CPT | Mod: RT

## 2019-05-20 ASSESSMENT — MIFFLIN-ST. JEOR: SCORE: 1713.89
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
## 2019-06-10 ENCOUNTER — ANCILLARY PROCEDURE (OUTPATIENT)
Dept: GENERAL RADIOLOGY | Facility: CLINIC | Age: 19
End: 2019-06-10
Attending: FAMILY MEDICINE
Payer: COMMERCIAL

## 2019-06-10 ENCOUNTER — OFFICE VISIT (OUTPATIENT)
Dept: FAMILY MEDICINE | Facility: CLINIC | Age: 19
End: 2019-06-10
Payer: COMMERCIAL

## 2019-06-10 VITALS
OXYGEN SATURATION: 97 % | DIASTOLIC BLOOD PRESSURE: 60 MMHG | HEIGHT: 69 IN | HEART RATE: 93 BPM | BODY MASS INDEX: 23.7 KG/M2 | RESPIRATION RATE: 19 BRPM | SYSTOLIC BLOOD PRESSURE: 120 MMHG | WEIGHT: 160 LBS

## 2019-06-10 DIAGNOSIS — S99.911A ANKLE INJURY, RIGHT, INITIAL ENCOUNTER: Primary | ICD-10-CM

## 2019-06-10 DIAGNOSIS — S99.911A ANKLE INJURY, RIGHT, INITIAL ENCOUNTER: ICD-10-CM

## 2019-06-10 DIAGNOSIS — S90.811A ABRASION OF RIGHT FOOT, INITIAL ENCOUNTER: ICD-10-CM

## 2019-06-10 PROCEDURE — 99213 OFFICE O/P EST LOW 20 MIN: CPT | Performed by: FAMILY MEDICINE

## 2019-06-10 PROCEDURE — 73610 X-RAY EXAM OF ANKLE: CPT | Mod: RT

## 2019-06-10 PROCEDURE — 73590 X-RAY EXAM OF LOWER LEG: CPT | Mod: RT

## 2019-06-10 ASSESSMENT — MIFFLIN-ST. JEOR: SCORE: 1726.14
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
## 2019-06-11 ENCOUNTER — TELEPHONE (OUTPATIENT)
Dept: FAMILY MEDICINE | Facility: CLINIC | Age: 19
End: 2019-06-11

## 2019-06-11 DIAGNOSIS — S93.401A SPRAIN OF RIGHT ANKLE, UNSPECIFIED LIGAMENT, INITIAL ENCOUNTER: Primary | ICD-10-CM

## 2019-06-11 NOTE — TELEPHONE ENCOUNTER
"Mom calling with questions about wound.     She is concerned as \"it looks green in parts\"     Advised it can look greenish as it begins to heal.  As long as no pus, increased redness or swelling at the site and pt is not running fever continue home cares as advised at OV yesterday.      She states \"he has more color in the toes today\" and still have good movement of toes.     Mom does not see any red streaking \"but this freaks me out\" thinking it could happen.   Advised very gently she could draw with black marker a Cheesh-Na around the wound.   As long as the redness stays in this area then no worries.  If the redness moves outside of the Cheesh-Na then should be seen in UC or clinic.     Pt can make appt at any time to have RN look at wound if they are concerned.  F/U with provider as advised in 1-2 weeks.     Mother expressed understanding and acceptance of the plan.  She had no further questions at this time.  Advised can call back to clinic at any time with concerns.     Saira Jones RN    "

## 2019-06-11 NOTE — TELEPHONE ENCOUNTER
Patient's mother called stating they would like the crutches that you offered in the visit yesterday. Mom is coming into the clinic to pick them up today. Please place a dme order for this.  Deepti Rosenbaum

## 2019-06-14 ENCOUNTER — TELEPHONE (OUTPATIENT)
Dept: FAMILY MEDICINE | Facility: CLINIC | Age: 19
End: 2019-06-14

## 2019-06-14 NOTE — TELEPHONE ENCOUNTER
Mom calling regarding pt's ankile injury and at what point does she worry about non-weight bearing.  She states the swelling has gone down and the wound is looking better.  Advised to continue home cares as discussed and if not getting better next week he should be seen.  If the swelling or wound get worse then he should be seen in UC over the weekend.    Mom expressed understanding and acceptance of the plan.  Mom had no further questions at this time.  Advised can call back to clinic at any time with concerns.       Michael Gould RN, BSN

## 2019-11-15 ENCOUNTER — ALLIED HEALTH/NURSE VISIT (OUTPATIENT)
Dept: NURSING | Facility: CLINIC | Age: 19
End: 2019-11-15
Payer: COMMERCIAL

## 2019-11-15 DIAGNOSIS — Z23 ENCOUNTER FOR IMMUNIZATION: Primary | ICD-10-CM

## 2019-11-15 PROCEDURE — 90471 IMMUNIZATION ADMIN: CPT

## 2019-11-15 PROCEDURE — 90651 9VHPV VACCINE 2/3 DOSE IM: CPT

## 2019-12-19 ENCOUNTER — OFFICE VISIT (OUTPATIENT)
Dept: PSYCHOLOGY | Facility: CLINIC | Age: 19
End: 2019-12-19
Payer: COMMERCIAL

## 2019-12-19 DIAGNOSIS — F41.1 GENERALIZED ANXIETY DISORDER: ICD-10-CM

## 2019-12-19 DIAGNOSIS — F33.1 MAJOR DEPRESSIVE DISORDER, RECURRENT EPISODE, MODERATE (H): Primary | ICD-10-CM

## 2019-12-19 PROCEDURE — 99207 ZZC NO CHARGE LOS: CPT | Performed by: PSYCHOLOGIST

## 2019-12-19 ASSESSMENT — COLUMBIA-SUICIDE SEVERITY RATING SCALE - C-SSRS
1. IN THE PAST MONTH, HAVE YOU WISHED YOU WERE DEAD OR WISHED YOU COULD GO TO SLEEP AND NOT WAKE UP?: NO
2. HAVE YOU ACTUALLY HAD ANY THOUGHTS OF KILLING YOURSELF?: NO
1. IN THE PAST MONTH, HAVE YOU WISHED YOU WERE DEAD OR WISHED YOU COULD GO TO SLEEP AND NOT WAKE UP?: NO
2. HAVE YOU ACTUALLY HAD ANY THOUGHTS OF KILLING YOURSELF LIFETIME?: NO

## 2019-12-19 ASSESSMENT — ANXIETY QUESTIONNAIRES
3. WORRYING TOO MUCH ABOUT DIFFERENT THINGS: MORE THAN HALF THE DAYS
1. FEELING NERVOUS, ANXIOUS, OR ON EDGE: NEARLY EVERY DAY
2. NOT BEING ABLE TO STOP OR CONTROL WORRYING: MORE THAN HALF THE DAYS
5. BEING SO RESTLESS THAT IT IS HARD TO SIT STILL: SEVERAL DAYS
GAD7 TOTAL SCORE: 11
6. BECOMING EASILY ANNOYED OR IRRITABLE: MORE THAN HALF THE DAYS
7. FEELING AFRAID AS IF SOMETHING AWFUL MIGHT HAPPEN: NOT AT ALL
IF YOU CHECKED OFF ANY PROBLEMS ON THIS QUESTIONNAIRE, HOW DIFFICULT HAVE THESE PROBLEMS MADE IT FOR YOU TO DO YOUR WORK, TAKE CARE OF THINGS AT HOME, OR GET ALONG WITH OTHER PEOPLE: VERY DIFFICULT

## 2019-12-19 ASSESSMENT — PATIENT HEALTH QUESTIONNAIRE - PHQ9
SUM OF ALL RESPONSES TO PHQ QUESTIONS 1-9: 16
5. POOR APPETITE OR OVEREATING: SEVERAL DAYS

## 2019-12-19 NOTE — PROGRESS NOTES
"                                                                                       Adult Intake Structured Interview      CLIENT'S NAME: Taiwo Mart  MRN:   [id number]  :   2000  ACCT. NUMBER: [id number]  DATE OF SERVICE: 19      Identifying Information:  Client is a 19 year old, , partnered / significant other male. Client was referred for a diagnostic assessment by PCP. The purpose of this evaluation is to: provide treatment recommendations and clarify diagnosis. Client is currently employed part-time. Client attended the session alone.       Client's Statement of Presenting Concern:  Client reported seeking services at this time for diagnostic assessment and recommendations for treatment. Client's presenting concerns include: \"Hard time focusing on tasks. School has been a problem with not doing work and failing classes due to lack of concentration.\" Client stated that his symptoms have resulted in the following functional impairments: academic performance. He noted that he can sit still to watch Netflix but has to be doing something else like playing on his phone; he gets bored easily. He misplaces things often (especially his keys). Some days he can be very productive and other days he will do nothing and just sit in bed. Client stated, \"I'm having a hard time with school because I don't go. I know that if I go and do the homework I could succeed, but I don't do it. I think that's what causes my anxiety too because I think about what that means for my future.\" He explained that he lacks the motivation to do these things.         History of Presenting Concern:  Client reported that he has not completed a previous ADHD diagnostic assessment.  Client has not received a previous diagnosis of ADHD.  Client has not been prescribed medication to address these problems. Client reported that these problem(s) began in childhood. Client has not attempted to resolve these concerns in the past. " "Client reported that other professional(s) are not involved in providing support / services. Client explained that he previously took anxiety medication (Celexa) but it changed his mood. He explained that he used to be outgoing but it made him feel \"bland\" and that he didn't want to do anything so he stopped taking it.     Social History:  Client reported he grew up in [place], [place]. Client was the first born of 2 children. This is an intact family and parents remain . Client reported that his childhood was \"good.\"  Client described his childhood family environment as nurturing and stable.  As a child, client reported that he failed to complete assigned chores in the home environment, had problems getting ready for school in the morning, had problems with organization and keeping track of items, misplaced or lost things, forgot school work or other items between home and school and needed frequent reminders by parents to be motivated or to complete work. He stated, \"My room is clean one day if I get motivated but then it slowly gets trashed. The next day it is already messy.\"  Client reported no difficulty with childhood peer relationships.  Client described his current relationships with family of origin as supportive with frequent communication.      Client reported a history of 1 committed relationship. Client has been partnered / significant other for 3 months. Client reported having 0 children. Client identified some stable and meaningful social connections. Client reported that he has been involved with the legal system. Client was charged with minor consumption one month before his 19st birthday. He was also charged with possession of alcohol.    Client's highest education level was high school graduate and some college. Client graduated high school in 2017 with a 2.3 GPA. He estimated he obtained mostly Cs. During the elementary, middle, and high school years, patient recalls academic strengths in the " "area of math. * Client reported experiencing academic problems in \"most\" because he didn't do homework. Client did identify the following learning problems: attention and concentration. Client did not receive tutoring services during the school years. Client did not receive special education services. Client reported significant behavior and discipline problems including: disruptive classroom behavior and frequent tardiness or absences and failure to finish or complete homework. He noted that he didn't do homework. He would forget to do homework so he would copy someone else's so that he had something to turn in. Client explained that he stopped attending school during his khris year; he had a few buddies die in a car accident and he thinks this might have impacted his motivation or interest in school.   He noted that he gave up on school during his khris and senior year and stopped going. He explained that he was a \"class clown\" and would try to be funny and got into trouble often. He was never suspended or expelled. He explained that he often skipped class during khris and senior year. He was still able to pass classes. He never studied for tests but still passed. He reported that he struggled to pay attention in class and would get bored easily.    Client did attend post secondary school. Client attended [employer] for 1.5 years. He explained that he did not do well there (he was placed on academic probation twice). He stated, \"I just didn't go and didn't do any of the work.\" He went to [employer] in [place], [place]. He lives with two of his friends there. He began attending in August 2019 and he dropped two out of his four classes. He is going to be placed on academic probation so he is not able to return next semester (he didn't complete enough credits). He stated, \"The classes are super easy. If I had gone every day and done the work I would have done well.\" He noted that he had a 9:00am " "and a 5:00pm class; due to this long break in between he lost motivation and would not want to go to one of them. He noted that he has a \"right mind\" on this and intends to attend class at the beginning of the semester but then he loses momentum. He is not currently going to school. He stated, \"I'm paying way too much for how little I'm getting out of school.\"     Client did not serve in the .  Client reported that he is currently employed part-time as a  at [employer]. He has held this job for a couple of months. Client reported that the current job is a good fit for his skills and personality.  Client reported that he often felt bored, been in conflict with boss / co-workers, disorganized behavior, distractible behavior and poor time management . In the past he has called in sick and he has had days when he is bored and does not want to be there.  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  The longest period of employment has been 1 year.  Client has been terminated from a place of employment. He reported that he was in [place], [place] and woke up and didn't want to work so he called in and they said that he had been messing up too much so they let him go. There are no ethnic, cultural or Rastafarian factors that may be relevant for therapy. Client identified his preferred language to be English. Client reported he does not need the assistance of an  or other support involved in treatment. Modifications will not be used to assist communication in treatment.     Client reports family history includes Allergies in his mother; Family History Negative in his father; No Known Problems in his sister.    Mental Health History:  Client reported the following biological family members or relatives with mental health issues: Mother experienced Anxiety.  Client previously received the following mental health diagnosis: Anxiety and Depression.  Client has received the " "following mental health services in the past: counseling and medication(s) from physician / PCP. Client was previously prescribed Citalopram. He last participated in individual therapy last spring. He noted that this was not helpful.  Hospitalizations: None.  Previous / current commitments: None. Client is not currently receiving any mental health services.     Chemical Health History:  Client reported no family history of chemical health issues. Client has not received chemical dependency treatment in the past. Client is not currently receiving any chemical dependency treatment. He reported that he was \"talked to\" about his drinking and went to [facility]; he completed an evaluation and did therapy through his [facility]. He noted that this was related to his drinking. Client reported the following problems as a result of drinking: legal issues (minor consumption one month before his 19st birthday). He explained that he had to go to [facility] because \"I was slacking in life and partying too much and doing stupid things. He reported that he broke glass and tried to pick it up when he was drunk. He stated, \"That was a phase and I don't like drinking because I don't like the hangovers.\" He noted that he has significantly reduced his drinking.    Client Reports:  Client reports using alcohol 1 times per week and has 2-4 beers at a time. Patient first started drinking at age 14.  Patient reported date of last use was last week.  Patient reports heaviest use was last year.  Client denies using tobacco. He noted he used to smoke a pack per day. He is vaping. He noted that he is \"constantly\" vaping during the day; at least once per hour. He was [age] years old when he started vaping.  Client denies using marijuana.  Client reports using caffeine 1 times per day and drinks 1 at a time. Patient started using caffeine at age 8.  Client denies using street drugs.  Client denies the non-medical use of prescription or over " the counter drugs.    CAGE: None of the patient's responses to the CAGE screening were positive / Negative CAGE score   Based on the negative Cage-Aid score and clinical interview there  are not indications of drug or alcohol abuse.    Discussed the general effects of drugs and alcohol on health and well-being. Therapist gave client printed information about the effects of chemical use on his health and well being.      Significant Losses / Trauma / Abuse / Neglect Issues:  There are indications or report of significant loss, trauma, abuse or neglect issues related to: death of his friends  in a car accident when he was a khris in high school.    Issues of possible neglect are not present.      Medical Issues:  Client has had a physical exam to rule out medical causes for current symptoms. Date of last physical exam was within the past year. Client was encouraged to follow up with PCP if symptoms were to develop.  The client has a [facility] Primary Care Provider, who is named Aaseby-Aguilera, Ramona Ann..  Client reports not having a psychiatrist.  Client reported no current medical concerns.  The client denies the presence of chronic or episodic pain.  As a child, client reported having sleep disturbance, including: daytime drowsiness / fatigue, insomnia and teeth grinding .  Client reported currently experiencing sleep disturbance, including: daytime drowsiness / fatigue, insomnia and teeth grinding.  Client reported sleeping approximately 3-12 hours per night.  He explained that his sleep varies; it seems like the days that he does not work he will sleep as much as he wants. He noted that he has trouble falling asleep; he can't fall asleep until 4:00am sometimes and then he will sleep in until 3:00pm. Client reported that he has not completed a sleep study. Client reported having he has lost his routine; his sleep is off and he isn't working out anymore so his appetite has been reduced. He noted that he will  "eat once per day. He has noticed this since he moved to [place] (in August). Client leaves the TV on and he thinks this might keep him awake, but he feels it is hard to fall asleep if it's quiet because he will think too much. He noted that he can think about \"anything and everything\" and then he is \"overthinking and might have an anxiety attack when I think about school.\" There are not significant nutritional concerns.  Client reported no current exercise routine. Client explained that his sleep scheduled has impacted his motivation to work out. He also moved and has to pay for everything so he hasn't been interested in working out as much.    Patient reports current meds as:   Outpatient Medications Marked as Taking for the 12/19/19 encounter (Appointment) with Adilia Yang, PhD   Medication Sig     multivitamin, therapeutic with minerals (MULTI-VITAMIN) TABS tablet Take 1 tablet by mouth daily     order for DME Equipment being ordered: crutches     order for DME Equipment being ordered: right wrist splint     UNABLE TO FIND MEDICATION NAME: vitamin pack that includes fish oil, vitamins A, C, E, vitamin B complex, calcium, vitamin D.       Client Allergies:  No Known Allergies  no known allergies to medications    Medical History:  No past medical history on file.    Medication Adherence:  N/A - Client does not have prescribed psychiatric medications.    Client was provided recommendation to follow-up with prescribing physician.    Risk Taking Behaviors:  Client reported a history of the following risk taking behaviors: excessive spending, impulsive decision making, risky sexual behaviors and substance use. He stated, \"I will make decisions. I bought a car but I don't like it at all. In the moment I decide to do something but then I regret it because I didn't think it through. 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 He has had " unprotected sex in the past.      Motor Vehicle Operation:  Client has received a 's license. Client has received moving violations, including: accidents due to inattention and 1 speeding tickets.  Client reported the following driving habits: experiencces road rage, frequently late for appointments, meetings, or work and gets lost easily.  According to client, other people are comfortable riding as a passenger when he is driving.        Mental Status Assessment:  Appearance:   Appropriate   Eye Contact:   Good   Psychomotor Behavior: Normal   Attitude:   Cooperative   Orientation:   All  Speech   Rate / Production: Normal    Volume:  Normal   Mood:    Normal  Affect:    Appropriate   Thought Content:  Clear   Thought Form:  Coherent  Logical   Insight:    Good       Review of Symptoms:  Depression: Sleep Interest Guilt Energy Concentration Appetite Psychomotor slowing or agitation  Susana:  No symptoms  Psychosis: No symptoms  Anxiety: Worries Nervousness  Panic:  No symptoms  Post Traumatic Stress Disorder: Trauma  Obsessive Compulsive Disorder: No symptoms  Eating Disorder: No symptoms  Oppositional Defiant Disorder: No symptoms  ADD / ADHD: Attention Task Completion Distractiblity  Conduct Disorder: No symptoms  Reckless Behavior: Excessive Spending Impulsive Decision Making        Safety Issues and Plan for Safety and Risk Management:  Client denies a history of suicidal ideation, suicide attempts, self-injurious behavior, homicidal ideation, homicidal behavior and and other safety concerns    Client denies current fears or concerns for personal safety.  Client denies current or recent suicidal ideation or behaviors.  Client denies current or recent homicidal ideation or behaviors.  Client denies current or recent self injurious behavior or ideation.  Client denies other safety concerns.  Client reports there are no firearms in the house.  Recommended that patient call 911 or go to the local ED should  there be a change in any of these risk factors.      Diagnostic Criteria:  A. Excessive anxiety and worry about a number of events or activities (such as work or school performance).   B. The person finds it difficult to control the worry.  C. Select 3 or more symptoms (required for diagnosis). Only one item is required in children.   - Restlessness or feeling keyed up or on edge.    - Being easily fatigued.    - Difficulty concentrating or mind going blank.    - Muscle tension.    - Sleep disturbance (difficulty falling or staying asleep, or restless unsatisfying sleep).   D. The focus of the anxiety and worry is not confined to features of an Axis I disorder.  E. The anxiety, worry, or physical symptoms cause clinically significant distress or impairment in social, occupational, or other important areas of functioning.   F. The disturbance is not due to the direct physiological effects of a substance (e.g., a drug of abuse, a medication) or a general medical condition (e.g., hyperthyroidism) and does not occur exclusively during a Mood Disorder, a Psychotic Disorder, or a Pervasive Developmental Disorder.  A) Recurrent episode(s) - symptoms have been present during the same 2-week period and represent a change from previous functioning 5 or more symptoms (required for diagnosis)   - Depressed mood. Note: In children and adolescents, can be irritable mood.     - Diminished interest or pleasure in all, or almost all, activities.    - Significant weight loss when not dieting decrease in appetite.    - Increased sleep.    - Fatigue or loss of energy.    - Feelings of worthlessness or inappropriate guilt.    - Diminished ability to think or concentrate, or indecisiveness.   B) The symptoms cause clinically significant distress or impairment in social, occupational, or other important areas of functioning  C) The episode is not attributable to the physiological effects of a substance or to another medical condition  D)  The occurence of major depressive episode is not better explained by other thought / psychotic disorders  E) There has never been a manic episode or hypomanic episode  - Often has difficulty sustaining attention in tasks or play activities  - Often does not follow through on instructions and fails to finish schoolwork, chores, or duties in the workplace  - Often has difficulty organizing tasks and activities  - Often avoids, dislikes, or is reluctant to engage in tasks that require sustained mental effort  - Is often easily distractedby extraneous stimuli      DSM5 Diagnoses: (Sustained by DSM5 Criteria Listed Above)  MDD, Recurrent, Moderate (F33.1)  MERT (F41.1)  RULE OUT: ADHD  Attendance Agreement:  Client has signed Attendance Agreement:Yes      Preliminary Plan:  The client reports no currently identified Islam, ethnic or cultural issues relevant to therapy.     services are not indicated.    Modifications to assist communication are not indicated.    Collaboration / coordination of treatment will be initiated with the following support professionals: primary care physician.    The following referral(s) will be initiated: PCP for medication management of anxiety/depression; counseling is encouraged/recommended as well.    A Release of Information is not needed at this time.    Client was given self and collaborative rating scales to be completed and mailed in. Depression and anxiety rating scales will be completed.  A second appointment was scheduled at this time.  Client will be staying to complete the MMPI-2 today.     Report to child / adult protection services was NA.    Patient will have open access to their mental health medical record.    Adilia Yang, PhD, LP  December 19, 2019

## 2019-12-20 ASSESSMENT — ANXIETY QUESTIONNAIRES: GAD7 TOTAL SCORE: 11

## 2019-12-23 ENCOUNTER — DOCUMENTATION ONLY (OUTPATIENT)
Dept: PSYCHOLOGY | Facility: CLINIC | Age: 19
End: 2019-12-23

## 2019-12-23 NOTE — PROGRESS NOTES
Client Name: Taiwo Mart   MRN: [id number]  : 2000    Client completed the [place] Multiphasic Personality Inventory-2 (MMPI-2), a self-report personality inventory, as part of his evaluation. Validity scales indicate that the client responded in an open and consistent manner, resulting in a valid profile. While over-reporting is possible, it is also likely that the Client has limited resources for coping with the stresses and demands of daily life. The following results should be interpreted with caution and in light of other information. Client s responses suggest that there may have also been elements of random responding present in Client s results. His profile reflects a high level of general emotional distress. Individuals with similar profiles tend to be preoccupied with concerns about their physical health. They also experience dread, angry impatient, chronic apprehension, worry, and a quickness to  lose it.  They may experience disturbed sleep and problems with attention and concentration. They may feel stressed out and vulnerable to upset by disappointment or decisions that don t work out. They may dread that a sudden unanticipated event will cause one to  go to pieces.  They report a history with substance abuse problems as well as emotional and behavioral under-control, antagonism toward authority figures, acting out, and avoiding responsibility. Individuals with similar profiles can be irritable and volatile and intolerant of frustration. They may have a low threshold for boredom and are excitable and over-reactive. They may have difficulty managing their temper. They may have inflated self-esteem and feel entitled while also feeling estranged, apart from, and misunderstood by others. They may experience feelings of resentment.

## 2020-01-17 ENCOUNTER — TELEPHONE (OUTPATIENT)
Dept: PSYCHOLOGY | Facility: CLINIC | Age: 20
End: 2020-01-17

## 2020-01-17 NOTE — TELEPHONE ENCOUNTER
[facility] Call Center    Phone Message    May a detailed message be left on voicemail: yes    Reason for Call: Patient would like a call back to make sure that the paperwork was received that he mailed and to discuss his ADHD testing. Please advise. Thank you.

## 2020-01-20 ENCOUNTER — TELEPHONE (OUTPATIENT)
Dept: PSYCHOLOGY | Facility: CLINIC | Age: 20
End: 2020-01-20

## 2020-01-20 NOTE — TELEPHONE ENCOUNTER
Called Client to explain that we are waiting to receive his completed ADHD self-report and collateral-report scales. Provided contact information in case Client needs new copies of these materials.

## 2020-02-04 ENCOUNTER — DOCUMENTATION ONLY (OUTPATIENT)
Dept: PSYCHOLOGY | Facility: CLINIC | Age: 20
End: 2020-02-04

## 2020-02-04 NOTE — PROGRESS NOTES
"Client Name: Taiwo Mart   MRN: [id number]  : 2000    Kevin Adult ADHD Rating Scale-IV: Self and Other Reports (BAARS-IV)  The BAARS-IV assesses for symptoms of ADHD that are experienced in one's daily life. This assessment measure includes self and collateral rating scales designed to provide information regarding current and childhood symptoms of ADHD including inattention, hyperactivity, and impulsivity. Self-report scores are reported as percentiles. Scores at the 76th-83rd percentile are considered marginal, scores at the 84th-92nd percentile are considered borderline, scores at the 93rd-95th percentile are considered mild, scores at the 96th-98th percentile are considered moderate, and those at the 99th percentile are considered severe. Collateral or \"other\" rating scales are reported as number of symptoms observed in comparison to those reported by the client. Norms and percentile scores are not available for collateral reports.      Current Symptoms Scale--Self Report:   Client completed the self-report inventory of current symptoms. The results indicate that the client's Total ADHD Score was 45 which places him in the 96th percentile for overall ADHD symptoms. In addition, the client endorsed the following occur \"often\" or \"very often\": 4/9 (95th percentile) Inattention symptoms, 6/9 (98th percentile) Hyperactivity/Impulsivity symptoms, and 1/9 (78th percentile) Sluggish Cognitive Tempo symptoms. Client indicated that the reported symptoms have resulted in impaired functioning in school, home, work and social relationships. Overall, the results suggest the client is reporting mild symptoms of inattention and moderate symptoms of hyperactivity/impulsivity at this time.      Current Symptoms Scale--Other Report:  Client's mother completed the collateral report inventory of current symptoms. Based on the collateral contact's observation of symptoms, the client demonstrates the following \"often\" or " "\"very often\": 7/9 Inattention symptoms, 0/5 Hyperactivity symptoms, 0/4 Impulsivity symptoms, and 8/9 Sluggish Cognitive Tempo symptoms. The client's Total ADHD Score was 40. The collateral- and self-report scores are somewhat discrepant; the Client s mother did not report observing symptoms of hyperactivity/impulsivity at this time.     Childhood Symptoms Scale--Self-Report:  Client completed the self-report inventory of childhood symptoms. The results indicate that the client's Total ADHD Score was 39 which places him in the 87th percentile for overall ADHD symptoms in childhood. In addition, the client endorsed having experienced the following \"often\" or \"very often\": 3/9 (85th percentile) Inattention symptoms and 2/9 (83rd percentile) Hyperactivity-Impulsivity symptoms. Client indicated that the reported symptoms resulted in impaired functioning in school and home. Overall, the results suggest the client reported experiencing borderline symptoms of inattention and hyperactivity/impulsivity as a child.     Childhood Symptoms Scale--Other Report:  Client's mother completed the collateral report inventory of current symptoms. Based on the collateral contact's observation of symptoms, the client demonstrates the following \"often\" or \"very often\": 8/9 Inattention symptoms and 0/9 Hyperactivity/Impulsivity symptoms. The client's Total ADHD Score was 44. The collateral- and self-report scores are discrepant; Client s mother reported observing more symptoms of inattention in childhood.      Kevin Functional Impairment Scale: Self and Other Reports (BFIS)  The BFIS is used to assess an individuals' psychosocial impairment in major life/daily activities that may be due to a mental health disorder. This assessment measure includes self and collateral rating scales. Self-report scores are reported as percentiles. Scores at the 76th-83rd percentile are considered marginal, scores at the 84th-92nd percentile are considered " "borderline, scores at the 93rd-95th percentile are considered mild, scores at the 96th-98th percentile are considered moderate, and those at the 99th percentile are considered severe. Collateral or \"other\" rating scales are reported as number of symptoms observed in comparison to those reported by the client. Norms and percentile scores are not available for collateral reports.      Results indicate the client identified impairment (scores at or greater than 93rd percentile) in the following areas: home-chores, social-friends, community activities, education, money management, driving, sexual relations, and daily responsibilities. The client's Mean Impairment Score was 6.43 (96th percentile) indicating the client is reporting moderate impairment in functioning across domains. Client's mother completed the collateral rating scale, which indicated similar results. The collateral contact's scores were somewhat lower than the client's report (e.g., Mean Impairment Score of 5.5). Client's mother noted impairment in the areas of: home-family, home-chores, work, education, daily responsibilities, and health maintenance.      Kevin Deficits in Executive Functioning Scale (BDEFS)  The BDEFS is a measure used for evaluating dimensions of adult executive functioning in daily life. This assessment measure includes self and collateral rating scales. Self-report scores are reported as percentiles. Scores at the 76th-83rd percentile are considered marginal, scores at the 84th-92nd percentile are considered borderline, scores at the 93rd-95th percentile are considered mild, scores at the 96th-98th percentile are considered moderate, and those at the 99th percentile are considered severe. Collateral or \"other\" rating scales are reported as number of symptoms observed in comparison to those reported by the client. Norms and percentile scores are not available for collateral reports.      Results indicate the client's Total " Executive Functioning Score was 229 (97th percentile). The ADHD-Executive Functioning Index score was 30 (97th percentile). These scores suggest the client has moderate deficits in executive functioning. These deficits may be due to ADHD or another mental health disorder. Results indicate the client identified significant deficits in the following areas: self-management to time (moderate); self-restraint (moderate); self-motivation (severe) and self-regulation of emotions (mild). Client's mother completed the collateral rating scale, which indicated similar results.     Generalized Anxiety Disorder Questionnaire (MERT-7)  This questionnaire is designed to screen for anxiety in adults. Based on the client's score of 16, he is reporting moderate symptoms of anxiety. Client identified the following symptoms of anxiety: feeling nervous, anxious or on edge; not being able to stop or control worrying; worrying too much about many different things, trouble relaxing, being so restless it s hard to sit still; becoming easily annoyed or irritable, and feeling afraid as if something awful might happen.    Patient Health Questionnaire- 9 (PHQ-9)   This questionnaire is designed to screen for depression in adults. Based on the client's score of 13, he is reporting moderate symptoms of depression. Client identified the following symptoms of depression: little interest or pleasure in doing things, feeling down/depressed/hopeless, trouble falling or staying asleep, feeling tired or having little energy, poor appetite or overeating, feeling bad about self, and poor concentration.

## 2020-02-06 ENCOUNTER — DOCUMENTATION ONLY (OUTPATIENT)
Dept: PSYCHOLOGY | Facility: CLINIC | Age: 20
End: 2020-02-06

## 2020-02-06 NOTE — PROGRESS NOTES
[facility]  ADHD Evaluation    Patient: Taiwo Mart  YOB: 2000  MRN: [id number]    Date(s) of assessment: Diagnostic Assessment (12/19/19); MMPI (Administered 12/19/19; Interpreted on 12/23/19); Kevin self-report and collateral measures scored and interpreted (2/4/20)    Information about appointment:  Client attended two sessions to aid in determining client's mental health diagnosis or diagnoses and treatment recommendations that best address client concerns. Available medical records were reviewed. There were no previous psychological evaluations for review. A diagnostic assessment was conducted at the initial appointment. Client completed several rating scales to assist in assessing attention-related and other mental health symptoms that may be causing impairments in functioning. Rating scales were also completed by a collateral contact.      Assessment tools:   Kevin Adult ADHD Rating Scale-IV: Self and Other Reports (BAARS-IV), Kevin Functional Impairment Scale: Self and Other Reports (BFIS), Kevin Deficits in Executive Functioning Scale: Self and Other Reports (BDEFS), Patient Health Questionnaire-9 (PHQ-9), and Generalized Anxiety Disorder-7 (MERT-7); [place] Multiphasic Personality Inventory-Second Edition (MMPI-2)     Assessment Results:  Behavioral Observations:  Client arrived to each session on-time. He was pleasant and cooperative at all times. Client did not demonstrate significant difficulties with inattention or hyperactivity/impulsivity during the sessions. The following results are likely to be an accurate reflection of Client's current functioning.    Kevin Adult ADHD Rating Scale-IV: Self and Other Reports (BAARS-IV)  The BAARS-IV assesses for symptoms of ADHD that are experienced in one's daily life. This assessment measure includes self and collateral rating scales designed to provide information regarding current and childhood symptoms of ADHD  "including inattention, hyperactivity, and impulsivity. Self-report scores are reported as percentiles. Scores at the 76th-83rd percentile are considered marginal, scores at the 84th-92nd percentile are considered borderline, scores at the 93rd-95th percentile are considered mild, scores at the 96th-98th percentile are considered moderate, and those at the 99th percentile are considered severe. Collateral or \"other\" rating scales are reported as number of symptoms observed in comparison to those reported by the client. Norms and percentile scores are not available for collateral reports.     Current Symptoms Scale--Self Report:   Client completed the self-report inventory of current symptoms. The results indicate that the client's Total ADHD Score was 45 which places him in the 96th percentile for overall ADHD symptoms. In addition, the client endorsed the following occur \"often\" or \"very often\": 4/9 (95th percentile) Inattention symptoms, 6/9 (98th percentile) Hyperactivity/Impulsivity symptoms, and 1/9 (78th percentile) Sluggish Cognitive Tempo symptoms. Client indicated that the reported symptoms have resulted in impaired functioning in school, home, work and social relationships. Overall, the results suggest the client is reporting mild symptoms of inattention and moderate symptoms of hyperactivity/impulsivity at this time.      Current Symptoms Scale--Other Report:  Client's mother completed the collateral report inventory of current symptoms. Based on the collateral contact's observation of symptoms, the client demonstrates the following \"often\" or \"very often\": 7/9 Inattention symptoms, 0/5 Hyperactivity symptoms, 0/4 Impulsivity symptoms, and 8/9 Sluggish Cognitive Tempo symptoms. The client's Total ADHD Score was 40. The collateral- and self-report scores are somewhat discrepant; the Client s mother did not report observing symptoms of hyperactivity/impulsivity at this time.      Childhood Symptoms " "Scale--Self-Report:  Client completed the self-report inventory of childhood symptoms. The results indicate that the client's Total ADHD Score was 39 which places him in the 87th percentile for overall ADHD symptoms in childhood. In addition, the client endorsed having experienced the following \"often\" or \"very often\": 3/9 (85th percentile) Inattention symptoms and 2/9 (83rd percentile) Hyperactivity-Impulsivity symptoms. Client indicated that the reported symptoms resulted in impaired functioning in school and home. Overall, the results suggest the client reported experiencing borderline symptoms of inattention and hyperactivity/impulsivity as a child.     Childhood Symptoms Scale--Other Report:  Client's mother completed the collateral report inventory of current symptoms. Based on the collateral contact's observation of symptoms, the client demonstrates the following \"often\" or \"very often\": 8/9 Inattention symptoms and 0/9 Hyperactivity/Impulsivity symptoms. The client's Total ADHD Score was 44. The collateral- and self-report scores are discrepant; Client s mother reported observing more symptoms of inattention in childhood.      Kevin Functional Impairment Scale: Self and Other Reports (BFIS)  The BFIS is used to assess an individuals' psychosocial impairment in major life/daily activities that may be due to a mental health disorder. This assessment measure includes self and collateral rating scales. Self-report scores are reported as percentiles. Scores at the 76th-83rd percentile are considered marginal, scores at the 84th-92nd percentile are considered borderline, scores at the 93rd-95th percentile are considered mild, scores at the 96th-98th percentile are considered moderate, and those at the 99th percentile are considered severe. Collateral or \"other\" rating scales are reported as number of symptoms observed in comparison to those reported by the client. Norms and percentile scores are not available " "for collateral reports.      Results indicate the client identified impairment (scores at or greater than 93rd percentile) in the following areas: home-chores, social-friends, community activities, education, money management, driving, sexual relations, and daily responsibilities. The client's Mean Impairment Score was 6.43 (96th percentile) indicating the client is reporting moderate impairment in functioning across domains. Client's mother completed the collateral rating scale, which indicated similar results. The collateral contact's scores were somewhat lower than the client's report (e.g., Mean Impairment Score of 5.5). Client's mother noted impairment in the areas of: home-family, home-chores, work, education, daily responsibilities, and health maintenance.      Kevin Deficits in Executive Functioning Scale (BDEFS)  The BDEFS is a measure used for evaluating dimensions of adult executive functioning in daily life. This assessment measure includes self and collateral rating scales. Self-report scores are reported as percentiles. Scores at the 76th-83rd percentile are considered marginal, scores at the 84th-92nd percentile are considered borderline, scores at the 93rd-95th percentile are considered mild, scores at the 96th-98th percentile are considered moderate, and those at the 99th percentile are considered severe. Collateral or \"other\" rating scales are reported as number of symptoms observed in comparison to those reported by the client. Norms and percentile scores are not available for collateral reports.      Results indicate the client's Total Executive Functioning Score was 229 (97th percentile). The ADHD-Executive Functioning Index score was 30 (97th percentile). These scores suggest the client has moderate deficits in executive functioning. These deficits may be due to ADHD or another mental health disorder. Results indicate the client identified significant deficits in the following areas: " self-management to time (moderate); self-restraint (moderate); self-motivation (severe) and self-regulation of emotions (mild). Client's mother completed the collateral rating scale, which indicated similar results.     Summary of [place] Multiphasic Personality Inventory--Second Edition   Client completed the [place] Multiphasic Personality Inventory-2 (MMPI-2), a self-report personality inventory, as part of his evaluation. Validity scales indicate that the client responded in an open and consistent manner, resulting in a valid profile. While over-reporting is possible, it is also likely that the Client has limited resources for coping with the stresses and demands of daily life. The following results should be interpreted with caution and in light of other information. Client s responses suggest that there may have also been elements of random responding present in Client s results. His profile reflects a high level of general emotional distress. Individuals with similar profiles tend to be preoccupied with concerns about their physical health. They also experience dread, angry impatient, chronic apprehension, worry, and a quickness to  lose it.  They may experience disturbed sleep and problems with attention and concentration. They may feel stressed out and vulnerable to upset by disappointment or decisions that don t work out. They may dread that a sudden unanticipated event will cause one to  go to pieces.  They report a history with substance abuse problems as well as emotional and behavioral under-control, antagonism toward authority figures, acting out, and avoiding responsibility. Individuals with similar profiles can be irritable and volatile and intolerant of frustration. They may have a low threshold for boredom and are excitable and over-reactive. They may have difficulty managing their temper. They may have inflated self-esteem and feel entitled while also feeling estranged, apart from, and  "misunderstood by others. They may experience feelings of resentment.    Generalized Anxiety Disorder Questionnaire (MERT-7)  This questionnaire is designed to screen for anxiety in adults. Based on the client's score of 16, he is reporting moderate symptoms of anxiety. Client identified the following symptoms of anxiety: feeling nervous, anxious or on edge; not being able to stop or control worrying; worrying too much about many different things, trouble relaxing, being so restless it s hard to sit still; becoming easily annoyed or irritable, and feeling afraid as if something awful might happen.    Patient Health Questionnaire- 9 (PHQ-9)   This questionnaire is designed to screen for depression in adults. Based on the client's score of 13, he is reporting moderate symptoms of depression. Client identified the following symptoms of depression: little interest or pleasure in doing things, feeling down/depressed/hopeless, trouble falling or staying asleep, feeling tired or having little energy, poor appetite or overeating, feeling bad about self, and poor concentration.    Summary (based on clinical interview, review of records, test results):  Client is a 19 year old, , partnered / significant other male. Client was referred for a diagnostic assessment by PCP. The purpose of this evaluation is to: provide treatment recommendations and clarify diagnosis. Client is currently employed part-time. Client's presenting concerns include: \"Hard time focusing on tasks. School has been a problem with not doing work and failing classes due to lack of concentration.\" Client stated that his symptoms have resulted in the following functional impairments: academic performance. He noted that he can sit still to watch [employer] but has to be doing something else like playing on his phone; he gets bored easily. He misplaces things often (especially his keys). Some days he can be very productive and other days he will do nothing " "and just sit in bed. Client stated, \"I'm having a hard time with school because I don't go. I know that if I go and do the homework I could succeed, but I don't do it. I think that's what causes my anxiety too because I think about what that means for my future.\" He explained that he lacks the motivation to do these things. Client reported that he has not completed a previous ADHD diagnostic assessment. Client has not received a previous diagnosis of ADHD. Client has not been prescribed medication to address these problems. Client reported that these problem(s) began in childhood. Client has not attempted to resolve these concerns in the past. Client previously received the following mental health diagnosis: Anxiety and Depression.  Client has received the following mental health services in the past: counseling and medication(s) from physician / PCP. Client was previously prescribed Citalopram. He last participated in individual therapy last spring. He noted that this was not helpful. He has never been hospitalized on an inpatient psychiatric unit. Previous / current commitments: None. Client is not currently receiving any mental health services. Client reported that other professional(s) are not   involved in providing support / services. Client explained that he previously took anxiety medication (Celexa) but it changed his mood. He explained that he used to be outgoing but it made him feel \"bland\" and that he didn't want to do anything so he stopped taking it.    Client has not received chemical dependency treatment in the past. Client is not currently receiving any chemical dependency treatment. He reported that he was \"talked to\" about his drinking and went to [facility]; he completed an evaluation and did therapy through his [facility]. He noted that this was related to his drinking. Client reported the following problems as a result of drinking: legal issues (minor consumption one month before his 19st " "birthday). He explained that he had to go to [facility] because \"I was slacking in life and partying too much and doing stupid things. He reported that he broke glass and tried to pick it up when he was drunk. He stated, \"That was a phase and I don't like drinking because I don't like the hangovers.\" He noted that he has significantly reduced his drinking.     Client reported he grew up in [place], [place]. Client was the first born of two children. This is an intact family and parents remain . Client reported that his childhood was \"good.\"  Client described his childhood family environment as nurturing and stable. As a child, client reported that he failed to complete assigned chores in the home environment, had problems getting ready for school in the morning, had problems with organization and keeping track of items, misplaced or lost things, forgot school work or other items between home and school and needed frequent reminders by parents to be motivated or to complete work. He stated, \"My room is clean one day if I get motivated but then it slowly gets trashed. The next day it is already messy.\"  Client reported no difficulty with childhood peer relationships.  Client described his current relationships with family of origin as supportive with frequent communication.  Client reported a history of one committed relationship. Client has been partnered / significant other for a few months. Client does not have children. Client identified some stable and meaningful social connections. Client reported that he has been involved with the legal system. Client was charged with minor consumption one month before his 19st birthday. He was also charged with possession of alcohol.     Client's highest education level was high school graduate and some college. Client graduated high school in 2017 with a 2.3 GPA. He estimated he obtained mostly Cs. During the elementary, middle, and high school years, patient recalls " "academic strengths in the area of math.  Client reported experiencing academic problems in \"most\" because he didn't do homework. Client did identify the following learning problems: attention and concentration. Client did not receive tutoring services during the school years. Client did not receive special education services. Client reported significant behavior and discipline problems including: disruptive classroom behavior and frequent tardiness or absences and failure to finish or complete homework. He noted that he didn't do homework. He would forget to do homework so he would copy someone else's so that he had something to turn in. Client explained that he stopped attending school during his khris year; he had a few buddies die in a car accident and he thinks this might have impacted his motivation or interest in school.   He noted that he gave up on school during his khris and senior year and stopped going. He explained that he was a \"class clown\" and would try to be funny and got into trouble often. He was never suspended or expelled. He explained that he often skipped class during khris and senior year. He was still able to pass classes. He never studied for tests but still passed. He reported that he struggled to pay attention in class and would get bored easily. Client did attend post-secondary school. Client attended [employer] for 1.5 years. He explained that he did not do well there (he was placed on academic probation twice). He stated, \"I just didn't go and didn't do any of the work.\" He went to [employer] in [place], [place]. He lives with two of his friends there. He began attending in August 2019 and he dropped two out of his four classes. He is going to be placed on academic probation so he is not able to return next semester (he didn't complete enough credits). He stated, \"The classes are super easy. If I had gone every day and done the work I would have done well.\" He noted " "that he had a 9:00am and a 5:00pm class; due to this long break in between he lost motivation and would not want to go to one of them. He noted that he has a \"right mind\" on this and intends to attend class at the beginning of the semester but then he loses momentum. He is not currently going to school. He stated, \"I'm paying way too much for how little I'm getting out of school.\"      Client did not serve in the . Client reported that he is currently employed part-time as a  at [employer]. He has held this job for a couple of months. Client reported that the current job is a good fit for his skills and personality.  Client reported that he often felt bored, been in conflict with boss / co-workers, disorganized behavior, distractible behavior and poor time management. In the past he has called in sick and he has had days when he is bored and does not want to be there. 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 The longest period of employment has been 1 year. Client has been terminated from a place of employment. He reported that he was in [place], [place] and woke up and didn't want to work so he called in and they said that he had been messing up too much so they let him go.     Results of testing were suggestive of ADHD. Rating scales suggested the client is experiencing symptoms of inattention that have been present since childhood. The collateral report corroborated this and noted current and childhood symptoms of ADHD. Deficits in executive functioning and impairment in functioning were reported to be in the moderate range. Client s responses on self-report scales suggested he is experiencing moderate anxiety and moderate depression at this time. Personality testing was significant for anxiety, impulsivity, and issues with attention and concentration. Based on the results of clinical interview and psychological testing, the client currently meets criteria for diagnoses " of Attention-Deficit/Hyperactivity Disorder, Predominantly Inattentive Presentation; Generalized Anxiety Disorder; and Major Depressive Disorder, Recurrent, Moderate. Client will be provided with the results of testing, diagnosis, and recommendations in his last appointment.    DSM5 Diagnoses: (Sustained by DSM5 Criteria Listed Above)    Attention-Deficit/Hyperactivity Disorder, Predominantly Inattentive Presentation (F90.0)    Generalized Anxiety Disorder (F41.1)    Major Depressive Disorder, Recurrent, Moderate (F33.1)    Psychosocial & Contextual Factors: work stress     Recommendations:    1. Schedule an appointment with your physician or psychiatrist to discuss a medication evaluation. Medication can be very helpful in treating the symptoms of depression, anxiety, and ADHD. It will be important that each condition is treated, as treatment for one without the other may lead to an increase in symptoms (e.g., treating ADHD, but not anxiety, can lead to increased anxiety).    2. Access resources through websites, books, and articles such as those provided in the Coping with ADHD handout.    3. Consider working with an ADHD  or individual therapist to learn skills to assist with symptom management, as well as ways to improve relationships, etc., that may have been impacted by your symptoms.    4. Individual therapy is recommended. Therapies focused on identifying and challenging problematic thought and behavior patterns while increasing the use of healthy coping skills has been found to be effective in treating anxiety and depression. It will be important to set goals in this therapy and work actively toward achieving short-term successes that lead to the completion of each goal. Action-oriented therapies, such as CBT and ACT are particularly recommended for the treatment of chronic anxiety and depression.    5. The use of behavioral strategies such as diaphragmatic breathing, guided imagery, and mindfulness is  often helpful in the management of anxiety symptoms.    6. Schedule a follow-up appointment with me in about 6 weeks to review symptoms, treatment involvement, and struggles and/or successes.       Adilia Yang, Ph.D.,   Licensed Psychologist

## 2020-02-11 ENCOUNTER — PSYCHE (OUTPATIENT)
Dept: PSYCHOLOGY | Facility: CLINIC | Age: 20
End: 2020-02-11
Payer: COMMERCIAL

## 2020-02-11 DIAGNOSIS — F33.1 MAJOR DEPRESSIVE DISORDER, RECURRENT EPISODE, MODERATE (H): Primary | ICD-10-CM

## 2020-02-11 DIAGNOSIS — F90.0 ATTENTION DEFICIT HYPERACTIVITY DISORDER, INATTENTIVE TYPE: ICD-10-CM

## 2020-02-11 DIAGNOSIS — F41.1 GENERALIZED ANXIETY DISORDER: ICD-10-CM

## 2020-02-11 PROCEDURE — 96130 PSYCL TST EVAL PHYS/QHP 1ST: CPT | Performed by: PSYCHOLOGIST

## 2020-02-11 PROCEDURE — 90791 PSYCH DIAGNOSTIC EVALUATION: CPT | Performed by: PSYCHOLOGIST

## 2020-02-11 PROCEDURE — 99207 ZZC NO CHARGE LOS: CPT | Performed by: PSYCHOLOGIST

## 2020-02-11 PROCEDURE — 96131 PSYCL TST EVAL PHYS/QHP EA: CPT | Performed by: PSYCHOLOGIST

## 2020-02-11 NOTE — PROGRESS NOTES
Client Name: Taiwo Mart Date: 2/11/2020    Service Type: Individual (ADHD Evaluation feedback session)     Session Start Time: 11:00 Session End Time: 11:20      Session Length: 20 minutes      Session #: (feedback)     Attendees: Client attended alone        DATA        Treatment Objective(s) Addressed in This Session:   Provided feedback on ADHD evaluation. Reviewed test results in depth and answered client's questions. Client diagnosed with Attention-Deficit/Hyperactivity Disorder, Predominantly Inattentive Presentation, Generalized Anxiety Disorder; and Major Depressive Disorder, Recurrent, Moderate. Reviewed ADHD symptom management handout. This provider also completed full written report of evaluation, including integration of testing data, summary, and recommendations. Please see Documentation Only dated 2/6/20.     Progress on / Status of Treatment Objective(s) / Homework:   Completed      Intervention:  ADHD Evaluation feedback; Reviewed report (can be found in Documentation Only encounter dated 2/6/20); Client was appreciative of the feedback and expressed understanding of the diagnoses.          ASSESSMENT: Current Emotional / Mental Status (status of significant symptoms):  Risk status (Self / Other harm or suicidal ideation)  Client denies current fears or concerns for personal safety.  Client denies current or recent suicidal ideation or behaviors.  Client denies current or recent homicidal ideation or behaviors.  Client denies current or recent self-injurious behavior or ideation.  Client denies other safety concerns.  A safety and risk management plan has not been developed at this time, however client was given the after-hours number / 911 should there be a change in any of these risk factors.     Appearance: Appropriate   Eye Contact: Good   Psychomotor Behavior: Normal   Attitude: Cooperative   Orientation: All  Speech  Rate / Production: Normal   Volume: Normal   Mood: Normal  Affect:  Appropriate   Thought Content: Clear   Thought Form: Coherent Logical   Insight: Good      Medication Review:  No current psychiatric medications prescribed     Medication Compliance:  NA     Changes in Health Issues:  None reported     Chemical Use Review:  Substance Use: Chemical use reviewed, no active concerns identified      Tobacco Use: No current tobacco use.      Collateral Reports Completed:  Routed note to Care Team Member(s)     PLAN: (Homework, other)       Recommendations:      1. Schedule an appointment with your physician or psychiatrist to discuss a medication evaluation. Medication can be very helpful in treating the symptoms of depression, anxiety, and ADHD. It will be important that each condition is treated, as treatment for one without the other may lead to an increase in symptoms (e.g., treating ADHD, but not anxiety, can lead to increased anxiety).    2. Access resources through websites, books, and articles such as those provided in the Coping with ADHD handout.    3. Consider working with an ADHD  or individual therapist to learn skills to assist with symptom management, as well as ways to improve relationships, etc., that may have been impacted by your symptoms.    4. Individual therapy is recommended. Therapies focused on identifying and challenging problematic thought and behavior patterns while increasing the use of healthy coping skills has been found to be effective in treating anxiety and depression. It will be important to set goals in this therapy and work actively toward achieving short-term successes that lead to the completion of each goal. Action-oriented therapies, such as CBT and ACT are particularly recommended for the treatment of chronic anxiety and depression.    5. The use of behavioral strategies such as diaphragmatic breathing, guided imagery, and mindfulness is often helpful in the management of anxiety symptoms.    6. Schedule a follow-up appointment with me in  about 6 weeks to review symptoms, treatment involvement, and struggles and/or successes.       Adilia Yang, Ph.D.,   Licensed Psychologist         Psychological Testing   Sample Billing/Services Summary   Clinical Interview/Assessment CPT  Code 64438  (Untimed)   Interview (Face-to-Face)  (12:30/1:15), (12/19/19, Day 1) 45 minutes   Documentation (Non-Face-to-Face)  (Start/Stop), (Date, Day #) 0 minutes (done in session)   Total Time: 45 minutes (XX minutes)   Total Units: 1       Testing Evaluation Services Base: 79778  (1st 60 mins) Add-on: 47708  (each addtl 60 mins)   Record Review and Clarify Referral Question   (12:15/12:30), (12/19/19) 15 minutes   Integration/Report Generation   (12:15/1:00), (12/23/19) - MMPI  (3:00/4:00) (2/3/20) - [place]  (9:00/10:00) (2/6/20) - report writing 45 minutes  60 minutes  60 minutes   Interactive Feedback Session  (11:00/11:20), (2/11/20) 20 minutes   Post-Service Work   (11:20/11:32), (2/11/20) 12 minutes   Total Time: 210 minutes (3 hours, 32 minutes)   Total Units: 1 3           Diagnosis(es): (ICD-10)  Attention-Deficit/Hyperactivity Disorder, Predominantly Inattentive Presentation (F90.0)  Generalized Anxiety Disorder (F41.1)  Major Depressive Disorder, Recurrent, Moderate (F33.1)

## 2020-02-24 ENCOUNTER — OFFICE VISIT (OUTPATIENT)
Dept: FAMILY MEDICINE | Facility: CLINIC | Age: 20
End: 2020-02-24
Payer: COMMERCIAL

## 2020-02-24 VITALS
TEMPERATURE: 97.9 F | WEIGHT: 152.6 LBS | DIASTOLIC BLOOD PRESSURE: 63 MMHG | HEIGHT: 69 IN | BODY MASS INDEX: 22.6 KG/M2 | HEART RATE: 87 BPM | SYSTOLIC BLOOD PRESSURE: 118 MMHG | OXYGEN SATURATION: 99 %

## 2020-02-24 DIAGNOSIS — F32.1 MAJOR DEPRESSIVE DISORDER, SINGLE EPISODE, MODERATE (H): ICD-10-CM

## 2020-02-24 DIAGNOSIS — F41.9 ANXIETY: ICD-10-CM

## 2020-02-24 DIAGNOSIS — F90.0 ATTENTION DEFICIT HYPERACTIVITY DISORDER (ADHD), PREDOMINANTLY INATTENTIVE TYPE: Primary | ICD-10-CM

## 2020-02-24 PROCEDURE — 99214 OFFICE O/P EST MOD 30 MIN: CPT | Performed by: PHYSICIAN ASSISTANT

## 2020-02-24 RX ORDER — DEXTROAMPHETAMINE SACCHARATE, AMPHETAMINE ASPARTATE, DEXTROAMPHETAMINE SULFATE AND AMPHETAMINE SULFATE 2.5; 2.5; 2.5; 2.5 MG/1; MG/1; MG/1; MG/1
10 TABLET ORAL 2 TIMES DAILY
Qty: 60 TABLET | Refills: 0 | Status: SHIPPED | OUTPATIENT
Start: 2020-02-24 | End: 2020-03-23

## 2020-02-24 ASSESSMENT — MIFFLIN-ST. JEOR: SCORE: 1687.57

## 2020-02-24 NOTE — PROGRESS NOTES
"Subjective     Taiwo Mart is a 19 year old male who presents to clinic today for the following health issues:    HPI   Pt. req adhd medication   Was recently tested for ADHD through [facility]   Diagnosed with anxiety, moderate depression and ADHD inattentive type.      Is in college at [employer] and living down there permanently           Current Outpatient Medications   Medication Sig Dispense Refill     amphetamine-dextroamphetamine (ADDERALL) 10 MG tablet Take 1 tablet (10 mg) by mouth 2 times daily 60 tablet 0     multivitamin, therapeutic with minerals (MULTI-VITAMIN) TABS tablet Take 1 tablet by mouth daily       order for DME Equipment being ordered: crutches 1 Units 0     order for DME Equipment being ordered: right wrist splint 1 Device 0     UNABLE TO FIND MEDICATION NAME: vitamin pack that includes fish oil, vitamins A, C, E, vitamin B complex, calcium, vitamin D.       varenicline (CHANTIX CHANDAN) 0.5 MG X 11 & 1 MG X 42 tablet Take 0.5 mg tab daily for 3 days, THEN 0.5 mg tab twice daily for 4 days, THEN 1 mg twice daily. (Patient not taking: Reported on 12/19/2019) 53 tablet 0     varenicline (CHANTIX) 1 MG tablet Take 1 tablet (1 mg) by mouth 2 times daily (Patient not taking: Reported on 12/19/2019) 60 tablet 2     No lab results found.   BP Readings from Last 3 Encounters:   02/24/20 118/63   06/10/19 120/60   05/20/19 122/60    Wt Readings from Last 3 Encounters:   02/24/20 69.2 kg (152 lb 9.6 oz)   06/10/19 72.6 kg (160 lb)   05/20/19 71.4 kg (157 lb 4.8 oz)                      Reviewed and updated as needed this visit by Provider         Review of Systems   ROS COMP: Constitutional, HEENT, cardiovascular, pulmonary, gi and gu systems are negative, except as otherwise noted.      Objective    /63 (BP Location: Right arm, Patient Position: Chair, Cuff Size: Adult Large)   Pulse 87   Temp 97.9  F (36.6  C) (Oral)   Ht 1.753 m (5' 9\")   Wt 69.2 kg (152 lb 9.6 oz)   SpO2 99%   BMI 22.54 " kg/m    Body mass index is 22.54 kg/m .  Physical Exam   GENERAL: healthy, alert and no distress  RESP: lungs clear to auscultation - no rales, rhonchi or wheezes  CV: regular rate and rhythm, normal S1 S2, no S3 or S4, no murmur, click or rub, no peripheral edema and peripheral pulses strong  NEURO: Normal strength and tone, mentation intact and speech normal  PSYCH: mentation appears normal, affect normal/bright    Diagnostic Test Results:  Labs reviewed in Epic        Assessment & Plan     1. Attention deficit hyperactivity disorder (ADHD), predominantly inattentive type   Risks, benefits, side effects and intended purposes discussed.  Advised that he should be seen by mental health as he hs anxiety and depression as well.  He lives in [place] so well need to find someone there.  Advised to call his insurance.     Agreed to start on adderall 10 mg in meantime.  Advised to eat a balanced diet and keep track of sleep.  Follow-up in 3-4 weeks     - amphetamine-dextroamphetamine (ADDERALL) 10 MG tablet; Take 1 tablet (10 mg) by mouth 2 times daily  Dispense: 60 tablet; Refill: 0       Patient Instructions   (F90.0) Attention deficit hyperactivity disorder (ADHD), predominantly inattentive type  (primary encounter diagnosis)  Comment:   Plan:     Well start with adderal 10 mg  And can take twice daily if needed.        Patient Education     What is ADHD?  Does your child have trouble sitting still or paying attention? You may have been told that ADHD (attention deficit hyperactivity disorder) may be the cause. A child with ADHD might have a hard time staying focused (attention deficit). He or she may also have trouble controlling impulses (hyperactivity disorder). A child with one or both of these problems struggles daily to perform and behave well. ADHD is no one s fault. But if left untreated, it can deprive a child of self-esteem, new relationships. and limit success.    Which of the following describe your  child?  These are some of the symptoms of ADHD:  Attention deficit    Lacks mental focus    Performs inconsistently    Is distracted easily    Has trouble shifting between tasks or settings    Is messy, or loses things    Forgets    Hyperactive/impulsive    Has trouble controlling impulses; might talk too much, interrupt, or have a hard time taking turns    Is easy to upset or anger    Is always moving (sometimes without purpose)    Does not learn from mistakes    What happens in the brain?  The brain controls your body, thoughts, and feelings. It does so with the help of neurotransmitters. These chemicals help the brain send and receive messages. With ADHD, the level of these chemicals often varies. This may cause signs of ADHD to come and go.  When messages are not received  With ADHD, chemicals in certain parts of the brain can be in short supply. Because of this, some messages do not travel between nerve cells. Messages that signal a person to control behavior or pay attention aren t passed along. As a result, traits common to ADHD may occur.  Remember your child s strengths  Children with ADHD can be challenging to raise. Because of this, it s easy to overlook their good traits. What s special about your child? Do your best to value and support your child s unique talents, strengths, and interests. To nurture and support your child's self-esteem, share your positive thoughts and feelings with your child as often as possible.  Date Last Reviewed: 12/1/2016 2000-2019 [employer]. [address] [place] [address]. All rights reserved. This information is not intended as a substitute for professional medical care. Always follow your healthcare professional's instructions.           Patient Education     Coping with Attention-Deficit/Hyperactivity Disorder (ADHD)  Helpful Tips for Adults  If you have ADHD, it can be hard to sit still, pay attention or control impulsive behavior. ADHD can  cause problems in many areas of your life. But you can learn ways to control your symptoms. Below are some ideas for coping with the most common ADHD problems.   Memory, focus and managing time  Be mindful of time.    Use a watch, phone, timer or other device that keeps correct time. Be sure you can see and hear it at all times.    Set more than one alarm to remind you how much time you have left for a task.    Give yourself more time than you think you need.    For important appointments or deadlines, set reminder alarms hours or days ahead of time.  Use a day planner.    A day planner can help you manage time and remember responsibilities.    Learning to use a planner is just like learning to use any tool. Practice makes perfect.  Use lists.    Lists and notes can help you keep track of regular tasks, projects, deadlines and appointments.    If you decide to use a daily planner, keep all lists and notes inside of it. Use color codes for tasks so you know which ones are the most important.  Learn to say no and set boundaries.    Do not take on too many projects or social plans.    Overbooking yourself can be overwhelming and can lead to missed commitments.  Repeat out loud instructions you have been given.    This will help you remember, as well as let others correct you if needed.  Organization  Create space.    Ask yourself what you need on a daily basis. Then, find storage bins or closets for things you don't need every day.    Have specific areas for things like keys, bills and other items that are easy to misplace.    Throw away or donate things you don't need.  Deal with it now.    You can avoid forgetfulness and clutter by doing things right away, not some time in the future.    This includes filing papers, cleaning up messes, opening and responding to mail and returning phone calls.  Set up a filing system.    Use dividers or separate file folders for different types of documents, such as medical records,  receipts and pay stubs.    Label and color-code your files so that you can quickly find what you need.  Break larger tasks into small, manageable pieces.     Write down the steps needed to finish the task. Follow each step in order until the task is done.    If needed, take small, timed breaks and return to finish the task.  Organize your work space.     Use lists or planners to organize your day.    Remove clutter from your desk.    Label any storage bins.    Reduce distraction as much as possible. Ideas include sitting facing the wall, closing your door or using a white noise machine.  Sitting still  Move around as needed.    Don't fight the urge to move around. If you need to fidget or stand up for a period of time to help you pay attention, then do so. But take care that you're not interrupting others.    You may also find it helpful to squeeze a stress ball.  Be active in a useful way.    Exercise can burn off extra energy, relieve stress, boost your mood and calm your mind.    Meditation, yoga or luba chi can help you better control your attention and impulses.  Stay healthy.    Get enough sleep.    Avoid caffeine late in the day.    Exercise.    Create a relaxing bedtime routine.    Stick to a schedule or daily routine.    Eat small meals throughout the day.    Avoid sugar, eat fewer carbohydrates and eat more protein.  Close relationships  Talk to your loved ones about ADHD.     There are many resources to help your loved one understand ADHD. See the Resources section on the next page.Divide daily tasks based on each person's strengths.    For example, if you have trouble with organization, you may not want to do financial planning tasks.  If you have trouble with focus, develop a sign that others in your life can use as a gentle reminder to pay attention.    The sign should be small but meaningful to you and the other person.Improve communication.    Use a dry erase board in a common area to help the whole  "family stay in touch better.    Keep regular to-do lists and compare scheduled activities every day.    Writing notes to each other is also very helpful.Be an active listener.    Try not to interrupt.    If you find your mind wandering when others are talking, mentally repeat their words so you can follow the conversation.    Ask questions and ask people to repeat what they said if needed.    Pay close attention to body language. Organize your thoughts.    If you need to have a serious conversation, write a list of the points you would like to make or the important ideas you want to talk about. This can help you stay focused and remember what you need to say.Think before speaking.    It can be hard to control your impulses when you feel strongly about something.    Before saying whatever pops into your head, stop to ask yourself \"Will this be helpful?\" or \"Will this help me get what I want?\"Take charge of your life.    Work to accept that some things are harder for you.    Make a plan to address these troubles and seek the support you need.  Resources  Online    ADD KOJI Drinksehvitalclip. www.Jooce.Runner    ADHD and You. \"Tips for Adults with ADHD.\"   www.adhdandyou.Runner/adhd-patient/adhd-tips-young-adult&#047;    Attention Deficit Disorder Association.   www.add.org    Children and Adults with Attention-Deficit/Hyperactivity Disorder (RAYA). www.raya.org    Elif Monae. \"33 Ways to Get Organized with Adult ADHD.\" www.[employer].com/adhd/article/729.htm    National Resource Center on ADHD.   www.myfm5rjna.org    Cathie Khoury. \"Daily Living Tips for Adult ADHD.\" www.medicinenet.com/living_tips_adult_adhd_pictures_slideshow/article.htm    Isidro Camarillo and Renee Nelson. \"Help for Adult ADD / ADHD.\" www.helpguide.org/mental/adhd_add_adult_strategies.htm    You can also find many apps for your phone that can help you with common ADHD struggles.  Books    Fernando Brown. ADHD in Adults. (2008).    Fernando CAO. " Kevin. Taking Charge of Adult ADHD. (2010).    Holger Shah. Delivered from Distraction. (2006).    Holger Shah. Driven to Distraction. (2011).    Meka Wilburn. ADD in the Workplace. (1997).    Meka Wilburn. ADD-Friendly Ways to Organize Your Life. (2002).    Madison Lopez. The ADHD Effect on Marriage: Understand and Rebuild Your Relationship in Six Steps. (2010).  Support groups    ADHD Adult Support Group  [address]  7:00 to 8:30 p.m., last Thursday of each month (except December).  Contact/RSVP: [email or web address]    ADHD Adult Support Group  [address]  Thursday 10:00 a.m. to 12:00 p.m. or 7:00 to 9:30 p.m.  Contact/RSVP: Abrahan Alberts. [phone]   or [email or web address]    ADHD Adult Support Group for Spouses/  Partners of adults with ADHD  [address]  Tuesday 12:00 to 2:00 p.m.   Contact/RSVP: Abrahan Alberts. [phone]   or [email or web address].The car easily beat  For informational purposes only. Not to replace the advice of your health care provider.   Copyright   2014 [employer] [facility]. All rights reserved. [employer] 515620 - REV 08/15.  For informational purposes only. Not to replace the advice of your health care provider.  Copyright   2018 [employer] [facility]. All rights reserved.               Return in about 4 weeks (around 3/23/2020) for ADHD recheck.    Ramona Ann Aaseby-Aguilera, PA-C  [facility]

## 2020-02-24 NOTE — PATIENT INSTRUCTIONS
(F90.0) Attention deficit hyperactivity disorder (ADHD), predominantly inattentive type  (primary encounter diagnosis)  Comment:   Plan:     Well start with adderal 10 mg  And can take twice daily if needed.        Patient Education     What is ADHD?  Does your child have trouble sitting still or paying attention? You may have been told that ADHD (attention deficit hyperactivity disorder) may be the cause. A child with ADHD might have a hard time staying focused (attention deficit). He or she may also have trouble controlling impulses (hyperactivity disorder). A child with one or both of these problems struggles daily to perform and behave well. ADHD is no one s fault. But if left untreated, it can deprive a child of self-esteem, new relationships. and limit success.    Which of the following describe your child?  These are some of the symptoms of ADHD:  Attention deficit    Lacks mental focus    Performs inconsistently    Is distracted easily    Has trouble shifting between tasks or settings    Is messy, or loses things    Forgets    Hyperactive/impulsive    Has trouble controlling impulses; might talk too much, interrupt, or have a hard time taking turns    Is easy to upset or anger    Is always moving (sometimes without purpose)    Does not learn from mistakes    What happens in the brain?  The brain controls your body, thoughts, and feelings. It does so with the help of neurotransmitters. These chemicals help the brain send and receive messages. With ADHD, the level of these chemicals often varies. This may cause signs of ADHD to come and go.  When messages are not received  With ADHD, chemicals in certain parts of the brain can be in short supply. Because of this, some messages do not travel between nerve cells. Messages that signal a person to control behavior or pay attention aren t passed along. As a result, traits common to ADHD may occur.  Remember your child s strengths  Children with ADHD can be  challenging to raise. Because of this, it s easy to overlook their good traits. What s special about your child? Do your best to value and support your child s unique talents, strengths, and interests. To nurture and support your child's self-esteem, share your positive thoughts and feelings with your child as often as possible.  Date Last Reviewed: 12/1/2016 2000-2019 [employer]. [address] [place] [address], [place] [address]. All rights reserved. This information is not intended as a substitute for professional medical care. Always follow your healthcare professional's instructions.           Patient Education     Coping with Attention-Deficit/Hyperactivity Disorder (ADHD)  Helpful Tips for Adults  If you have ADHD, it can be hard to sit still, pay attention or control impulsive behavior. ADHD can cause problems in many areas of your life. But you can learn ways to control your symptoms. Below are some ideas for coping with the most common ADHD problems.   Memory, focus and managing time  Be mindful of time.    Use a watch, phone, timer or other device that keeps correct time. Be sure you can see and hear it at all times.    Set more than one alarm to remind you how much time you have left for a task.    Give yourself more time than you think you need.    For important appointments or deadlines, set reminder alarms hours or days ahead of time.  Use a day planner.    A day planner can help you manage time and remember responsibilities.    Learning to use a planner is just like learning to use any tool. Practice makes perfect.  Use lists.    Lists and notes can help you keep track of regular tasks, projects, deadlines and appointments.    If you decide to use a daily planner, keep all lists and notes inside of it. Use color codes for tasks so you know which ones are the most important.  Learn to say no and set boundaries.    Do not take on too many projects or social plans.    Overbooking yourself can  be overwhelming and can lead to missed commitments.  Repeat out loud instructions you have been given.    This will help you remember, as well as let others correct you if needed.  Organization  Create space.    Ask yourself what you need on a daily basis. Then, find storage bins or closets for things you don't need every day.    Have specific areas for things like keys, bills and other items that are easy to misplace.    Throw away or donate things you don't need.  Deal with it now.    You can avoid forgetfulness and clutter by doing things right away, not some time in the future.    This includes filing papers, cleaning up messes, opening and responding to mail and returning phone calls.  Set up a filing system.    Use dividers or separate file folders for different types of documents, such as medical records, receipts and pay stubs.    Label and color-code your files so that you can quickly find what you need.  Break larger tasks into small, manageable pieces.     Write down the steps needed to finish the task. Follow each step in order until the task is done.    If needed, take small, timed breaks and return to finish the task.  Organize your work space.     Use lists or planners to organize your day.    Remove clutter from your desk.    Label any storage bins.    Reduce distraction as much as possible. Ideas include sitting facing the wall, closing your door or using a white noise machine.  Sitting still  Move around as needed.    Don't fight the urge to move around. If you need to fidget or stand up for a period of time to help you pay attention, then do so. But take care that you're not interrupting others.    You may also find it helpful to squeeze a stress ball.  Be active in a useful way.    Exercise can burn off extra energy, relieve stress, boost your mood and calm your mind.    Meditation, yoga or luba chi can help you better control your attention and impulses.  Stay healthy.    Get enough  "sleep.    Avoid caffeine late in the day.    Exercise.    Create a relaxing bedtime routine.    Stick to a schedule or daily routine.    Eat small meals throughout the day.    Avoid sugar, eat fewer carbohydrates and eat more protein.  Close relationships  Talk to your loved ones about ADHD.     There are many resources to help your loved one understand ADHD. See the Resources section on the next page.Divide daily tasks based on each person's strengths.    For example, if you have trouble with organization, you may not want to do financial planning tasks.  If you have trouble with focus, develop a sign that others in your life can use as a gentle reminder to pay attention.    The sign should be small but meaningful to you and the other person.Improve communication.    Use a dry erase board in a common area to help the whole family stay in touch better.    Keep regular to-do lists and compare scheduled activities every day.    Writing notes to each other is also very helpful.Be an active listener.    Try not to interrupt.    If you find your mind wandering when others are talking, mentally repeat their words so you can follow the conversation.    Ask questions and ask people to repeat what they said if needed.    Pay close attention to body language. Organize your thoughts.    If you need to have a serious conversation, write a list of the points you would like to make or the important ideas you want to talk about. This can help you stay focused and remember what you need to say.Think before speaking.    It can be hard to control your impulses when you feel strongly about something.    Before saying whatever pops into your head, stop to ask yourself \"Will this be helpful?\" or \"Will this help me get what I want?\"Take charge of your life.    Work to accept that some things are harder for you.    Make a plan to address these troubles and seek the support you need.  Resources  Online    ADD Warehouse. " "www.addwarehouse.com    ADHD and You. \"Tips for Adults with ADHD.\"   www.adhdandyou.com/adhd-patient/adhd-tips-young-adult&#047;    Attention Deficit Disorder Association.   www.add.org    Children and Adults with Attention-Deficit/Hyperactivity Disorder (RAYA). www.raya.org    Elif Monae. \"33 Ways to Get Organized with Adult ADHD.\" www.Meedor.com/adhd/article/729.htm    [place] [facility] on ADHD.   www.knko4etdo.org    Cathie Khoury. \"Daily Living Tips for Adult ADHD.\" www.[employer]/living_tips_adult_adhd_pictures_slideshow/article.htm    Isidro Camarillo and Renee Nelson. \"Help for Adult ADD / ADHD.\" www.helpguide.org/mental/adhd_add_adult_strategies.htm    You can also find many apps for your phone that can help you with common ADHD struggles.  Books    Fernando Brown. ADHD in Adults. (2008).    Fernando Brown. Taking Charge of Adult ADHD. (2010).    Holger Gonzales and Neal Shah. Delivered from Distraction. (2006).    Holger Gonzales and Neal Shah. Driven to Distraction. (2011).    Meka Wilburn. ADD in the Workplace. (1997).    Meka Wilburn. ADD-Friendly Ways to Organize Your Life. (2002).    Madison Lopez. The ADHD Effect on Marriage: Understand and Rebuild Your Relationship in Six Steps. (2010).  Support groups    ADHD Adult Support Group  [address]  7:00 to 8:30 p.m., last Thursday of each month (except December).  Contact/RSVP: [email or web address]    ADHD Adult Support Group  [address]  Thursday 10:00 a.m. to 12:00 p.m. or 7:00 to 9:30 p.m.  Contact/RSVP: Abrahan Alberts. [phone]   or [email or web address]    ADHD Adult Support Group for Spouses/  Partners of adults with ADHD  [address]  Tuesday 12:00 to 2:00 p.m.   Contact/RSVP: Abrahan Alberts. [phone]   or [email or web address]  For informational purposes only. Not to replace the advice " of your health care provider.   Copyright   2014 [employer]. All rights reserved. [employer] 535991 - REV 08/15.  For informational purposes only. Not to replace the advice of your health care provider.  Copyright   2018 [employer]. All rights reserved.

## 2020-03-23 ENCOUNTER — VIRTUAL VISIT (OUTPATIENT)
Dept: FAMILY MEDICINE | Facility: CLINIC | Age: 20
End: 2020-03-23
Payer: COMMERCIAL

## 2020-03-23 DIAGNOSIS — F90.0 ATTENTION DEFICIT HYPERACTIVITY DISORDER (ADHD), PREDOMINANTLY INATTENTIVE TYPE: ICD-10-CM

## 2020-03-23 DIAGNOSIS — F41.9 ANXIETY: Primary | ICD-10-CM

## 2020-03-23 DIAGNOSIS — F32.1 MAJOR DEPRESSIVE DISORDER, SINGLE EPISODE, MODERATE (H): ICD-10-CM

## 2020-03-23 PROCEDURE — 99214 OFFICE O/P EST MOD 30 MIN: CPT | Mod: TEL | Performed by: PHYSICIAN ASSISTANT

## 2020-03-23 RX ORDER — DEXTROAMPHETAMINE SACCHARATE, AMPHETAMINE ASPARTATE, DEXTROAMPHETAMINE SULFATE AND AMPHETAMINE SULFATE 2.5; 2.5; 2.5; 2.5 MG/1; MG/1; MG/1; MG/1
10 TABLET ORAL 3 TIMES DAILY
Qty: 90 TABLET | Refills: 0 | Status: SHIPPED | OUTPATIENT
Start: 2020-03-23 | End: 2020-12-10

## 2020-03-23 NOTE — PROGRESS NOTES
"Taiwo Mart is a 19 year old male who is being evaluated via a billable telephone visit.      The patient has been notified of following:     \"This telephone visit will be conducted via a call between you and your physician/provider. We have found that certain health care needs can be provided without the need for a physical exam.  This service lets us provide the care you need with a short phone conversation.  If a prescription is necessary we can send it directly to your pharmacy.  If lab work is needed we can place an order for that and you can then stop by our lab to have the test done at a later time.    If during the course of the call the physician/provider feels a telephone visit is not appropriate, you will not be charged for this service.\"     Taiwo Mart complains of    Chief Complaint   Patient presents with     Recheck Medication       I have reviewed and updated the patient's Past Medical History, Social History, Family History and Medication List.    ALLERGIES  Patient has no known allergies.    ADHD : Taiwo was seen in February after being tested for ADHD that came back positive for inattentive type.  He also has a history of anxiety and depression.  This complicated his ADHD diagnoses.  We agreed to start him on a 10 mg instant release Adderall to take twice daily if needed and he is following up today.  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  He states that he has taken his medication later in the day to help with this and so this has improved slightly    He states that his anxiety and depression have been very well controlled and thinks that the ADHD medication has helped with those issues    Assessment/Plan:  1. Attention deficit hyperactivity disorder (ADHD), predominantly inattentive type  iWll increase Adderall " to 1 tablet 3 times daily and requested follow-up in 1 month  - amphetamine-dextroamphetamine (ADDERALL) 10 MG tablet; Take 1 tablet (10 mg) by mouth 3 times daily  Dispense: 90 tablet; Refill: 0    2. Anxiety  Stable symptoms and have seemed to improve since being treated for ADHD    3. Major depressive disorder, single episode, moderate (H)  See above       Phone call duration:  8 minutes    Ramona Ann Aaseby-Aguilera, PA-C

## 2020-04-27 ENCOUNTER — TELEPHONE (OUTPATIENT)
Dept: FAMILY MEDICINE | Facility: CLINIC | Age: 20
End: 2020-04-27

## 2020-04-27 NOTE — TELEPHONE ENCOUNTER
Reason for call:  Medication   If this is a refill request, has the caller requested the refill from the pharmacy already? N/A  Will the patient be using a [facility]? No  Name of the pharmacy and phone number for the current request: [facility]: [phone]    Name of the medication requested: amphetamine-dextroamphetamine (ADDERALL) 10 MG tablet    Other request: Patient is completely out, aware of 3 day rule, but wondering if you can fill it today please. Thanks!    Phone number to reach patient:  Cell number on file:    Telephone Information:   Mobile [phone]       Best Time:  anytime    Can we leave a detailed message on this number?  NO    Travel screening: Not Applicable

## 2020-04-27 NOTE — LETTER
May 6, 2020      Taiwo S Barron  [address]  [place] [address]        Dear Taiwo,     We have been trying to reach you, recently we received a refill request for medication amphetamine-dextroamphetamine (ADDERALL) 10 MG tablet. According to our records you are do for a follow up visit prior to a refill. We can do this visit virtual, so please call us at [phone] to scheduled this appointment.          Sincerely,          Ramona Ann Aaseby-Aguilera, PA-C

## 2020-04-27 NOTE — TELEPHONE ENCOUNTER
LMTRC    Pt needs med check as he had a dose increase and PCP requested a 1 month follow up    Michael Gould RN, BSN

## 2020-12-06 ENCOUNTER — HEALTH MAINTENANCE LETTER (OUTPATIENT)
Age: 20
End: 2020-12-06

## 2020-12-10 ENCOUNTER — OFFICE VISIT (OUTPATIENT)
Dept: INTERNAL MEDICINE | Facility: CLINIC | Age: 20
End: 2020-12-10
Payer: COMMERCIAL

## 2020-12-10 VITALS
SYSTOLIC BLOOD PRESSURE: 134 MMHG | HEART RATE: 107 BPM | BODY MASS INDEX: 23.34 KG/M2 | TEMPERATURE: 98.2 F | OXYGEN SATURATION: 100 % | RESPIRATION RATE: 16 BRPM | DIASTOLIC BLOOD PRESSURE: 62 MMHG | WEIGHT: 157.6 LBS | HEIGHT: 69 IN

## 2020-12-10 DIAGNOSIS — N50.89 ENLARGED TESTICLE: Primary | ICD-10-CM

## 2020-12-10 PROCEDURE — 99213 OFFICE O/P EST LOW 20 MIN: CPT | Performed by: NURSE PRACTITIONER

## 2020-12-10 ASSESSMENT — MIFFLIN-ST. JEOR: SCORE: 1705.25

## 2020-12-10 ASSESSMENT — PATIENT HEALTH QUESTIONNAIRE - PHQ9: SUM OF ALL RESPONSES TO PHQ QUESTIONS 1-9: 4

## 2020-12-10 NOTE — PROGRESS NOTES
"Subjective     Taiwo Mart is a 20 year old male who presents to clinic today for the following health issues:    HPI         1 year h/o lump or enlarged R testicle, not tender  No injury or previous testicular c/o    Wt stable    Patient Active Problem List   Diagnosis     Anxiety     Tobacco use disorder     Current Outpatient Medications   Medication     multivitamin, therapeutic with minerals (MULTI-VITAMIN) TABS tablet     UNABLE TO FIND     No current facility-administered medications for this visit.              Review of Systems   Constitutional, HEENT, cardiovascular, pulmonary, gi and gu systems are negative, except as otherwise noted.      Objective    /62 (BP Location: Right arm, Patient Position: Sitting, Cuff Size: Adult Regular)   Pulse 107   Temp 98.2  F (36.8  C) (Oral)   Resp 16   Ht 1.753 m (5' 9\")   Wt 71.5 kg (157 lb 9.6 oz)   SpO2 100%   BMI 23.27 kg/m    Body mass index is 23.27 kg/m .  Physical Exam   GENERAL: healthy, alert and no distress   (male): testicular enlargement on R no firm mass, could be hydrocele, no hernias and penis normal without urethral discharge            Assessment & Plan     Enlarged testicle    - US Testicular & Scrotum w Doppler Ltd; Future     Tobacco Cessation:   reports that he has been smoking cigarettes. He has been smoking about 0.50 packs per day. He has never used smokeless tobacco.           F/u pending US results    Lu Martínez NP  [facility]    "

## 2020-12-10 NOTE — NURSING NOTE
"patient states that he found a lump on his rt testicle , no pain,swelling redness.  Vital signs:  Temp: 98.2  F (36.8  C) Temp src: Oral BP: 134/62 Pulse: 107   Resp: 16 SpO2: 100 %     Height: 175.3 cm (5' 9\") Weight: 71.5 kg (157 lb 9.6 oz)  Estimated body mass index is 23.27 kg/m  as calculated from the following:    Height as of this encounter: 1.753 m (5' 9\").    Weight as of this encounter: 71.5 kg (157 lb 9.6 oz).          "

## 2020-12-15 ENCOUNTER — HOSPITAL ENCOUNTER (OUTPATIENT)
Dept: ULTRASOUND IMAGING | Facility: CLINIC | Age: 20
Discharge: HOME | End: 2020-12-15
Attending: NURSE PRACTITIONER | Admitting: NURSE PRACTITIONER
Payer: COMMERCIAL

## 2020-12-15 DIAGNOSIS — N50.89 ENLARGED TESTICLE: ICD-10-CM

## 2020-12-15 PROCEDURE — 93976 VASCULAR STUDY: CPT

## 2020-12-16 ENCOUNTER — TELEPHONE (OUTPATIENT)
Dept: INTERNAL MEDICINE | Facility: CLINIC | Age: 20
End: 2020-12-16

## 2020-12-16 NOTE — TELEPHONE ENCOUNTER
Please advise pt that testicular US showed a simple cyst on the R testicle, recommend urology f/u if growing or increasingly bothersome.  Lu Martínez CNP

## 2021-04-30 ENCOUNTER — VIRTUAL VISIT (OUTPATIENT)
Dept: FAMILY MEDICINE | Facility: CLINIC | Age: 21
End: 2021-04-30
Payer: COMMERCIAL

## 2021-04-30 DIAGNOSIS — F90.0 ATTENTION DEFICIT HYPERACTIVITY DISORDER (ADHD), PREDOMINANTLY INATTENTIVE TYPE: ICD-10-CM

## 2021-04-30 PROCEDURE — 99213 OFFICE O/P EST LOW 20 MIN: CPT | Mod: TEL | Performed by: PHYSICIAN ASSISTANT

## 2021-04-30 RX ORDER — DEXTROAMPHETAMINE SACCHARATE, AMPHETAMINE ASPARTATE, DEXTROAMPHETAMINE SULFATE AND AMPHETAMINE SULFATE 2.5; 2.5; 2.5; 2.5 MG/1; MG/1; MG/1; MG/1
10 TABLET ORAL 2 TIMES DAILY
Qty: 60 TABLET | Refills: 0 | Status: SHIPPED | OUTPATIENT
Start: 2021-04-30 | End: 2021-12-01

## 2021-04-30 NOTE — PROGRESS NOTES
Taiwo is a 20 year old who is being evaluated via a billable telephone visit.      What phone number would you like to be contacted at? [phone]  How would you like to obtain your AVS? MyChart    Assessment & Plan     Attention deficit hyperactivity disorder (ADHD), predominantly inattentive type  Well start back on adderral 10 ir  To take bid  - amphetamine-dextroamphetamine (ADDERALL) 10 MG tablet; Take 1 tablet (10 mg) by mouth 2 times daily      Tobacco Cessation:   reports that he has been smoking other. He has been smoking about 0.50 packs per day. He has never used smokeless tobacco.      FUTURE APPOINTMENTS:       - Follow-up visit in 3 weeks    No follow-ups on file.    Ramona Ann Aaseby-Aguilera, PA-C  [facility]   Taiwo is a 20 year old who presents for the following health issues   HPI     Concern - ADHD  Onset: Diagnosed 2 YEARS AGO  Description: taking for school then stopped when finished school, now works as a  and its affecting his job  Intensity: moderate  Progression of Symptoms:  worsening and constant  Accompanying Signs & Symptoms: trying to stay organized at work  Previous history of similar problem: yes  Precipitating factors:        Worsened by: na  Alleviating factors:        Improved by: Adderall  Therapies tried and outcome: Adderall 10 mg         Review of Systems   Constitutional, HEENT, cardiovascular, pulmonary, gi and gu systems are negative, except as otherwise noted.      Objective           Vitals:  No vitals were obtained today due to virtual visit.    Physical Exam   healthy, alert and no distress  PSYCH: Alert and oriented times 3; coherent speech, normal   rate and volume, able to articulate logical thoughts, able   to abstract reason, no tangential thoughts, no hallucinations   or delusions  His affect is normal  RESP: No cough, no audible wheezing, able to talk in full sentences  Remainder of exam unable to be  completed due to telephone visits        Phone call duration:   15 minutes

## 2021-05-19 ENCOUNTER — VIRTUAL VISIT (OUTPATIENT)
Dept: FAMILY MEDICINE | Facility: CLINIC | Age: 21
End: 2021-05-19
Payer: COMMERCIAL

## 2021-05-19 DIAGNOSIS — F90.0 ATTENTION DEFICIT HYPERACTIVITY DISORDER (ADHD), PREDOMINANTLY INATTENTIVE TYPE: ICD-10-CM

## 2021-05-19 PROCEDURE — 99213 OFFICE O/P EST LOW 20 MIN: CPT | Mod: TEL | Performed by: PHYSICIAN ASSISTANT

## 2021-05-19 RX ORDER — DEXTROAMPHETAMINE SACCHARATE, AMPHETAMINE ASPARTATE, DEXTROAMPHETAMINE SULFATE AND AMPHETAMINE SULFATE 2.5; 2.5; 2.5; 2.5 MG/1; MG/1; MG/1; MG/1
10 TABLET ORAL 2 TIMES DAILY
Qty: 60 TABLET | Refills: 0 | Status: CANCELLED | OUTPATIENT
Start: 2021-05-19

## 2021-05-19 RX ORDER — DEXTROAMPHETAMINE SACCHARATE, AMPHETAMINE ASPARTATE, DEXTROAMPHETAMINE SULFATE AND AMPHETAMINE SULFATE 2.5; 2.5; 2.5; 2.5 MG/1; MG/1; MG/1; MG/1
10 TABLET ORAL 2 TIMES DAILY
Qty: 60 TABLET | Refills: 0 | Status: SHIPPED | OUTPATIENT
Start: 2021-07-20 | End: 2021-08-19

## 2021-05-19 RX ORDER — DEXTROAMPHETAMINE SACCHARATE, AMPHETAMINE ASPARTATE, DEXTROAMPHETAMINE SULFATE AND AMPHETAMINE SULFATE 2.5; 2.5; 2.5; 2.5 MG/1; MG/1; MG/1; MG/1
10 TABLET ORAL 2 TIMES DAILY
Qty: 60 TABLET | Refills: 0 | Status: SHIPPED | OUTPATIENT
Start: 2021-05-19 | End: 2021-06-18

## 2021-05-19 RX ORDER — DEXTROAMPHETAMINE SACCHARATE, AMPHETAMINE ASPARTATE, DEXTROAMPHETAMINE SULFATE AND AMPHETAMINE SULFATE 2.5; 2.5; 2.5; 2.5 MG/1; MG/1; MG/1; MG/1
10 TABLET ORAL 2 TIMES DAILY
Qty: 60 TABLET | Refills: 0 | Status: SHIPPED | OUTPATIENT
Start: 2021-06-19 | End: 2021-07-19

## 2021-05-19 NOTE — PROGRESS NOTES
Taiwo is a 20 year old who is being evaluated via a billable telephone visit.      What phone number would you like to be contacted at? [phone]  How would you like to obtain your AVS? Mail a copy    Assessment & Plan     Attention deficit hyperactivity disorder (ADHD), predominantly inattentive type  Stable and doing well on Adderall 10 mg twice daily.  Requested follow-up in 6 months  - amphetamine-dextroamphetamine (ADDERALL) 10 MG tablet; Take 1 tablet (10 mg) by mouth 2 times daily  - amphetamine-dextroamphetamine (ADDERALL) 10 MG tablet; Take 1 tablet (10 mg) by mouth 2 times daily  - amphetamine-dextroamphetamine (ADDERALL) 10 MG tablet; Take 1 tablet (10 mg) by mouth 2 times daily        FUTURE APPOINTMENTS:       - Follow-up visit in 6 months     Return in about 6 months (around 11/19/2021) for ADHD recheck.    Ramona Ann Aaseby-Aguilera, PA-C  [facility]   Taiwo is a 20 year old who presents for the following health issues     HPI     Medication Followup of adderall    Taking Medication as prescribed: yes    Side Effects:  None    Medication Helping Symptoms:  yes     Taiwo has been taking Adderall 10 mg twice daily and states this is working very well.  He states that he has focus through his whole workday and goes to sleep at about 10:30 at night and has no trouble with this.  Also states that it does not affect his appetite and he has no other symptoms    Review of Systems   Constitutional, HEENT, cardiovascular, pulmonary, gi and gu systems are negative, except as otherwise noted.      Objective           Vitals:  No vitals were obtained today due to virtual visit.    Physical Exam   healthy, alert and no distress  PSYCH: Alert and oriented times 3; coherent speech, normal   rate and volume, able to articulate logical thoughts, able   to abstract reason, no tangential thoughts, no hallucinations   or delusions  His affect is normal  RESP: No cough, no  audible wheezing, able to talk in full sentences  Remainder of exam unable to be completed due to telephone visits                Phone call duration: 15 minutes

## 2021-09-03 DIAGNOSIS — F90.0 ATTENTION DEFICIT HYPERACTIVITY DISORDER (ADHD), PREDOMINANTLY INATTENTIVE TYPE: ICD-10-CM

## 2021-09-03 RX ORDER — DEXTROAMPHETAMINE SACCHARATE, AMPHETAMINE ASPARTATE, DEXTROAMPHETAMINE SULFATE AND AMPHETAMINE SULFATE 2.5; 2.5; 2.5; 2.5 MG/1; MG/1; MG/1; MG/1
10 TABLET ORAL 2 TIMES DAILY
Qty: 60 TABLET | Refills: 0 | Status: SHIPPED | OUTPATIENT
Start: 2021-11-04 | End: 2021-12-04

## 2021-09-03 RX ORDER — DEXTROAMPHETAMINE SACCHARATE, AMPHETAMINE ASPARTATE, DEXTROAMPHETAMINE SULFATE AND AMPHETAMINE SULFATE 2.5; 2.5; 2.5; 2.5 MG/1; MG/1; MG/1; MG/1
10 TABLET ORAL 2 TIMES DAILY
Qty: 60 TABLET | Refills: 0 | Status: CANCELLED | OUTPATIENT
Start: 2021-09-03

## 2021-09-03 RX ORDER — DEXTROAMPHETAMINE SACCHARATE, AMPHETAMINE ASPARTATE, DEXTROAMPHETAMINE SULFATE AND AMPHETAMINE SULFATE 2.5; 2.5; 2.5; 2.5 MG/1; MG/1; MG/1; MG/1
10 TABLET ORAL 2 TIMES DAILY
Qty: 60 TABLET | Refills: 0 | Status: SHIPPED | OUTPATIENT
Start: 2021-10-04 | End: 2021-11-03

## 2021-09-03 RX ORDER — DEXTROAMPHETAMINE SACCHARATE, AMPHETAMINE ASPARTATE, DEXTROAMPHETAMINE SULFATE AND AMPHETAMINE SULFATE 2.5; 2.5; 2.5; 2.5 MG/1; MG/1; MG/1; MG/1
10 TABLET ORAL 2 TIMES DAILY
Qty: 60 TABLET | Refills: 0 | Status: SHIPPED | OUTPATIENT
Start: 2021-09-03 | End: 2021-10-03

## 2021-09-03 NOTE — TELEPHONE ENCOUNTER
Patient called, looking for adderall prescription. States pharmacy was to send request, do not see this request in chart.     Routing refill request to provider for review/approval because:  Drug not on the [facility] refill protocol     Patient will be out of medication tomorrow.     Colette Coffey, PAUN, RN  [facility]

## 2021-09-25 ENCOUNTER — HEALTH MAINTENANCE LETTER (OUTPATIENT)
Age: 21
End: 2021-09-25

## 2021-12-01 DIAGNOSIS — F90.0 ATTENTION DEFICIT HYPERACTIVITY DISORDER (ADHD), PREDOMINANTLY INATTENTIVE TYPE: ICD-10-CM

## 2021-12-01 RX ORDER — DEXTROAMPHETAMINE SACCHARATE, AMPHETAMINE ASPARTATE, DEXTROAMPHETAMINE SULFATE AND AMPHETAMINE SULFATE 2.5; 2.5; 2.5; 2.5 MG/1; MG/1; MG/1; MG/1
10 TABLET ORAL 2 TIMES DAILY
Qty: 60 TABLET | Refills: 0 | Status: SHIPPED | OUTPATIENT
Start: 2021-12-01 | End: 2022-01-04

## 2022-01-04 DIAGNOSIS — F90.0 ATTENTION DEFICIT HYPERACTIVITY DISORDER (ADHD), PREDOMINANTLY INATTENTIVE TYPE: ICD-10-CM

## 2022-01-04 RX ORDER — DEXTROAMPHETAMINE SACCHARATE, AMPHETAMINE ASPARTATE, DEXTROAMPHETAMINE SULFATE AND AMPHETAMINE SULFATE 2.5; 2.5; 2.5; 2.5 MG/1; MG/1; MG/1; MG/1
10 TABLET ORAL 2 TIMES DAILY
Qty: 60 TABLET | Refills: 0 | Status: SHIPPED | OUTPATIENT
Start: 2022-01-04 | End: 2022-02-04

## 2022-01-04 NOTE — TELEPHONE ENCOUNTER
Routing refill request to provider for review/approval because:  Drug not on the [facility] refill protocol     Gerri Rodriguez RN   [facility]  -- Triage Nurse

## 2022-01-15 ENCOUNTER — HEALTH MAINTENANCE LETTER (OUTPATIENT)
Age: 22
End: 2022-01-15

## 2022-02-04 DIAGNOSIS — F90.0 ATTENTION DEFICIT HYPERACTIVITY DISORDER (ADHD), PREDOMINANTLY INATTENTIVE TYPE: ICD-10-CM

## 2022-02-04 RX ORDER — DEXTROAMPHETAMINE SACCHARATE, AMPHETAMINE ASPARTATE, DEXTROAMPHETAMINE SULFATE AND AMPHETAMINE SULFATE 2.5; 2.5; 2.5; 2.5 MG/1; MG/1; MG/1; MG/1
10 TABLET ORAL 2 TIMES DAILY
Qty: 60 TABLET | Refills: 0 | Status: SHIPPED | OUTPATIENT
Start: 2022-02-04 | End: 2022-03-07

## 2022-02-04 NOTE — TELEPHONE ENCOUNTER
Routing refill request to provider for review/approval because:  Drug not on the [facility] refill protocol     Gerri Rodriguez RN   [facility]  -- Triage Nurse

## 2022-03-07 DIAGNOSIS — F90.0 ATTENTION DEFICIT HYPERACTIVITY DISORDER (ADHD), PREDOMINANTLY INATTENTIVE TYPE: ICD-10-CM

## 2022-03-07 RX ORDER — DEXTROAMPHETAMINE SACCHARATE, AMPHETAMINE ASPARTATE, DEXTROAMPHETAMINE SULFATE AND AMPHETAMINE SULFATE 2.5; 2.5; 2.5; 2.5 MG/1; MG/1; MG/1; MG/1
10 TABLET ORAL 2 TIMES DAILY
Qty: 60 TABLET | Refills: 0 | Status: SHIPPED | OUTPATIENT
Start: 2022-03-07 | End: 2023-07-06

## 2022-03-07 NOTE — TELEPHONE ENCOUNTER
Routing refill request to provider for review/approval because:  Drug not on the [facility] refill protocol     Pt has appt scheduled with PCP on 3/11/22.    Karin TRACY RN

## 2022-03-07 NOTE — TELEPHONE ENCOUNTER
Patient calling for refill on adderall, he is scheduled for virtual visit this Friday. Patient is completely out of medication.  Tess King,

## 2022-03-11 ENCOUNTER — VIRTUAL VISIT (OUTPATIENT)
Dept: FAMILY MEDICINE | Facility: CLINIC | Age: 22
End: 2022-03-11
Payer: COMMERCIAL

## 2022-03-11 DIAGNOSIS — F32.1 MAJOR DEPRESSIVE DISORDER, SINGLE EPISODE, MODERATE (H): Primary | ICD-10-CM

## 2022-03-11 DIAGNOSIS — F90.0 ATTENTION DEFICIT HYPERACTIVITY DISORDER (ADHD), PREDOMINANTLY INATTENTIVE TYPE: ICD-10-CM

## 2022-03-11 PROCEDURE — 99213 OFFICE O/P EST LOW 20 MIN: CPT | Mod: 95 | Performed by: PHYSICIAN ASSISTANT

## 2022-03-11 RX ORDER — DEXTROAMPHETAMINE SACCHARATE, AMPHETAMINE ASPARTATE, DEXTROAMPHETAMINE SULFATE AND AMPHETAMINE SULFATE 2.5; 2.5; 2.5; 2.5 MG/1; MG/1; MG/1; MG/1
10 TABLET ORAL 2 TIMES DAILY
Qty: 60 TABLET | Refills: 0 | Status: SHIPPED | OUTPATIENT
Start: 2022-05-12 | End: 2022-05-09

## 2022-03-11 RX ORDER — DEXTROAMPHETAMINE SACCHARATE, AMPHETAMINE ASPARTATE, DEXTROAMPHETAMINE SULFATE AND AMPHETAMINE SULFATE 2.5; 2.5; 2.5; 2.5 MG/1; MG/1; MG/1; MG/1
10 TABLET ORAL 2 TIMES DAILY
Qty: 60 TABLET | Refills: 0 | Status: SHIPPED | OUTPATIENT
Start: 2022-03-11 | End: 2022-04-10

## 2022-03-11 RX ORDER — DEXTROAMPHETAMINE SACCHARATE, AMPHETAMINE ASPARTATE, DEXTROAMPHETAMINE SULFATE AND AMPHETAMINE SULFATE 2.5; 2.5; 2.5; 2.5 MG/1; MG/1; MG/1; MG/1
10 TABLET ORAL 2 TIMES DAILY
Qty: 60 TABLET | Refills: 0 | Status: SHIPPED | OUTPATIENT
Start: 2022-04-11 | End: 2022-05-11

## 2022-03-11 NOTE — PROGRESS NOTES
Taiwo is a 21 year old who is being evaluated via a billable video visit.      How would you like to obtain your AVS? MyChart  If the video visit is dropped, the invitation should be resent by: Text to cell phone: [phone]  Will anyone else be joining your video visit? No      Video Start Time: 8:08 AM    Assessment & Plan     Major depressive disorder, single episode, moderate (H)  Stable     Attention deficit hyperactivity disorder (ADHD), predominantly inattentive type  Continue current medication with Adderall XR 25 mg daily.  Patient given postdated prescriptions for 3 months time. Follow-up in 6 months for medication recheck.  Continue non-medication behavioral modifications to improve attention and organization including having a regular schedule, breaking projects into smaller manageable time periods, work in quiet distraction free environment, achieve regular sleep pattern and healthy diet, remain physically active, as well as using lists, reminders, and calendars.  Follow-up in 3 months.  - amphetamine-dextroamphetamine (ADDERALL) 10 MG tablet; Take 1 tablet (10 mg) by mouth 2 times daily  - amphetamine-dextroamphetamine (ADDERALL) 10 MG tablet; Take 1 tablet (10 mg) by mouth 2 times daily  - amphetamine-dextroamphetamine (ADDERALL) 10 MG tablet; Take 1 tablet (10 mg) by mouth 2 times daily          Return in about 2 months (around 5/11/2022) for Physical Exam.    Ramona Ann Aaseby-Aguilera, PA-C  [facility]   Taiwo is a 21 year old who presents for the following health issues     HPI     ADHD: ...Adderall 10 mg bid.   working well at this dosage.  Denies tics, palpitations, insomnia or trouble with appetite    Medication Followup of Adderall 10MG     Taking Medication as prescribed: yes    Side Effects:  Was given a different brand which didn't work as well. Did get the same brand this time, so everything is better.    Medication Helping Symptoms:  yes     Good  Brand- Amphetamine Dark blue tablet  Bad Brand- Started with a D, light blue          Review of Systems   Constitutional, HEENT, cardiovascular, pulmonary, gi and gu systems are negative, except as otherwise noted.      Objective           Vitals:  No vitals were obtained today due to virtual visit.    Physical Exam   GENERAL: Healthy, alert and no distress  EYES: Eyes grossly normal to inspection.  No discharge or erythema, or obvious scleral/conjunctival abnormalities.  RESP: No audible wheeze, cough, or visible cyanosis.  No visible retractions or increased work of breathing.    SKIN: Visible skin clear. No significant rash, abnormal pigmentation or lesions.  NEURO: Cranial nerves grossly intact.  Mentation and speech appropriate for age.  PSYCH: Mentation appears normal, affect normal/bright, judgement and insight intact, normal speech and appearance well-groomed.        Video-Visit Details    Type of service:  Video Visit    Video End Time:8:15 AM    Originating Location (pt. Location): Home    Distant Location (provider location):  [facility]     Platform used for Video Visit: Feliz

## 2022-05-09 DIAGNOSIS — F90.0 ATTENTION DEFICIT HYPERACTIVITY DISORDER (ADHD), PREDOMINANTLY INATTENTIVE TYPE: ICD-10-CM

## 2022-05-09 RX ORDER — DEXTROAMPHETAMINE SACCHARATE, AMPHETAMINE ASPARTATE, DEXTROAMPHETAMINE SULFATE AND AMPHETAMINE SULFATE 2.5; 2.5; 2.5; 2.5 MG/1; MG/1; MG/1; MG/1
10 TABLET ORAL 2 TIMES DAILY
Qty: 60 TABLET | Refills: 0 | Status: SHIPPED | OUTPATIENT
Start: 2022-05-12 | End: 2023-07-06

## 2022-05-09 NOTE — TELEPHONE ENCOUNTER
His Rx for Adderall is in [place] and he can't get there to pick it up. Can he please have it sent to [facility]?     Thank you. Chanell Delaney RN on 5/9/2022 at 12:39 PM

## 2022-05-31 ENCOUNTER — OFFICE VISIT (OUTPATIENT)
Dept: FAMILY MEDICINE | Facility: CLINIC | Age: 22
End: 2022-05-31
Payer: COMMERCIAL

## 2022-05-31 VITALS
DIASTOLIC BLOOD PRESSURE: 58 MMHG | BODY MASS INDEX: 23.4 KG/M2 | TEMPERATURE: 98.4 F | HEART RATE: 103 BPM | WEIGHT: 158 LBS | HEIGHT: 69 IN | SYSTOLIC BLOOD PRESSURE: 120 MMHG | RESPIRATION RATE: 18 BRPM | OXYGEN SATURATION: 100 %

## 2022-05-31 DIAGNOSIS — Z13.1 SCREENING FOR DIABETES MELLITUS: ICD-10-CM

## 2022-05-31 DIAGNOSIS — Z11.3 SCREEN FOR STD (SEXUALLY TRANSMITTED DISEASE): ICD-10-CM

## 2022-05-31 DIAGNOSIS — Z11.4 SCREENING FOR HIV (HUMAN IMMUNODEFICIENCY VIRUS): ICD-10-CM

## 2022-05-31 DIAGNOSIS — Z13.220 SCREENING, LIPID: ICD-10-CM

## 2022-05-31 DIAGNOSIS — Z11.59 NEED FOR HEPATITIS C SCREENING TEST: ICD-10-CM

## 2022-05-31 DIAGNOSIS — Z13.0 SCREENING FOR BLOOD DISEASE: ICD-10-CM

## 2022-05-31 DIAGNOSIS — F90.0 ATTENTION DEFICIT HYPERACTIVITY DISORDER (ADHD), PREDOMINANTLY INATTENTIVE TYPE: Primary | ICD-10-CM

## 2022-05-31 DIAGNOSIS — Z00.00 ROUTINE GENERAL MEDICAL EXAMINATION AT A HEALTH CARE FACILITY: ICD-10-CM

## 2022-05-31 LAB
CHOLEST SERPL-MCNC: 218 MG/DL
ERYTHROCYTE [DISTWIDTH] IN BLOOD BY AUTOMATED COUNT: 12.2 % (ref 10–15)
FASTING STATUS PATIENT QL REPORTED: YES
HBA1C MFR BLD: 5.1 % (ref 0–5.6)
HCT VFR BLD AUTO: 46.6 % (ref 40–53)
HDLC SERPL-MCNC: 59 MG/DL
HGB BLD-MCNC: 16.3 G/DL (ref 13.3–17.7)
LDLC SERPL CALC-MCNC: 122 MG/DL
MCH RBC QN AUTO: 30.4 PG (ref 26.5–33)
MCHC RBC AUTO-ENTMCNC: 35 G/DL (ref 31.5–36.5)
MCV RBC AUTO: 87 FL (ref 78–100)
NONHDLC SERPL-MCNC: 159 MG/DL
PLATELET # BLD AUTO: 203 10E3/UL (ref 150–450)
RBC # BLD AUTO: 5.37 10E6/UL (ref 4.4–5.9)
TRIGL SERPL-MCNC: 185 MG/DL
WBC # BLD AUTO: 4 10E3/UL (ref 4–11)

## 2022-05-31 PROCEDURE — 87389 HIV-1 AG W/HIV-1&-2 AB AG IA: CPT | Performed by: PHYSICIAN ASSISTANT

## 2022-05-31 PROCEDURE — 87491 CHLMYD TRACH DNA AMP PROBE: CPT | Performed by: PHYSICIAN ASSISTANT

## 2022-05-31 PROCEDURE — 36415 COLL VENOUS BLD VENIPUNCTURE: CPT | Performed by: PHYSICIAN ASSISTANT

## 2022-05-31 PROCEDURE — 87591 N.GONORRHOEAE DNA AMP PROB: CPT | Performed by: PHYSICIAN ASSISTANT

## 2022-05-31 PROCEDURE — 83036 HEMOGLOBIN GLYCOSYLATED A1C: CPT | Performed by: PHYSICIAN ASSISTANT

## 2022-05-31 PROCEDURE — 99213 OFFICE O/P EST LOW 20 MIN: CPT | Mod: 25 | Performed by: PHYSICIAN ASSISTANT

## 2022-05-31 PROCEDURE — 99395 PREV VISIT EST AGE 18-39: CPT | Performed by: PHYSICIAN ASSISTANT

## 2022-05-31 PROCEDURE — 80061 LIPID PANEL: CPT | Performed by: PHYSICIAN ASSISTANT

## 2022-05-31 PROCEDURE — 86803 HEPATITIS C AB TEST: CPT | Performed by: PHYSICIAN ASSISTANT

## 2022-05-31 PROCEDURE — 85027 COMPLETE CBC AUTOMATED: CPT | Performed by: PHYSICIAN ASSISTANT

## 2022-05-31 RX ORDER — DEXTROAMPHETAMINE SACCHARATE, AMPHETAMINE ASPARTATE, DEXTROAMPHETAMINE SULFATE AND AMPHETAMINE SULFATE 2.5; 2.5; 2.5; 2.5 MG/1; MG/1; MG/1; MG/1
10 TABLET ORAL 2 TIMES DAILY
Qty: 60 TABLET | Refills: 0 | Status: SHIPPED | OUTPATIENT
Start: 2022-08-01 | End: 2022-08-31

## 2022-05-31 RX ORDER — DEXTROAMPHETAMINE SACCHARATE, AMPHETAMINE ASPARTATE, DEXTROAMPHETAMINE SULFATE AND AMPHETAMINE SULFATE 2.5; 2.5; 2.5; 2.5 MG/1; MG/1; MG/1; MG/1
10 TABLET ORAL 2 TIMES DAILY
Qty: 60 TABLET | Refills: 0 | Status: SHIPPED | OUTPATIENT
Start: 2022-05-31 | End: 2022-06-30

## 2022-05-31 RX ORDER — DEXTROAMPHETAMINE SACCHARATE, AMPHETAMINE ASPARTATE, DEXTROAMPHETAMINE SULFATE AND AMPHETAMINE SULFATE 2.5; 2.5; 2.5; 2.5 MG/1; MG/1; MG/1; MG/1
10 TABLET ORAL 2 TIMES DAILY
Qty: 60 TABLET | Refills: 0 | Status: SHIPPED | OUTPATIENT
Start: 2022-07-01 | End: 2022-08-11

## 2022-05-31 SDOH — ECONOMIC STABILITY: INCOME INSECURITY: IN THE LAST 12 MONTHS, WAS THERE A TIME WHEN YOU WERE NOT ABLE TO PAY THE MORTGAGE OR RENT ON TIME?: NO

## 2022-05-31 SDOH — ECONOMIC STABILITY: FOOD INSECURITY: WITHIN THE PAST 12 MONTHS, YOU WORRIED THAT YOUR FOOD WOULD RUN OUT BEFORE YOU GOT MONEY TO BUY MORE.: NEVER TRUE

## 2022-05-31 SDOH — HEALTH STABILITY: PHYSICAL HEALTH: ON AVERAGE, HOW MANY MINUTES DO YOU ENGAGE IN EXERCISE AT THIS LEVEL?: 30 MIN

## 2022-05-31 SDOH — ECONOMIC STABILITY: TRANSPORTATION INSECURITY
IN THE PAST 12 MONTHS, HAS LACK OF TRANSPORTATION KEPT YOU FROM MEETINGS, WORK, OR FROM GETTING THINGS NEEDED FOR DAILY LIVING?: NO

## 2022-05-31 SDOH — HEALTH STABILITY: PHYSICAL HEALTH: ON AVERAGE, HOW MANY DAYS PER WEEK DO YOU ENGAGE IN MODERATE TO STRENUOUS EXERCISE (LIKE A BRISK WALK)?: 5 DAYS

## 2022-05-31 SDOH — ECONOMIC STABILITY: FOOD INSECURITY: WITHIN THE PAST 12 MONTHS, THE FOOD YOU BOUGHT JUST DIDN'T LAST AND YOU DIDN'T HAVE MONEY TO GET MORE.: NEVER TRUE

## 2022-05-31 SDOH — ECONOMIC STABILITY: INCOME INSECURITY: HOW HARD IS IT FOR YOU TO PAY FOR THE VERY BASICS LIKE FOOD, HOUSING, MEDICAL CARE, AND HEATING?: NOT HARD AT ALL

## 2022-05-31 SDOH — ECONOMIC STABILITY: TRANSPORTATION INSECURITY
IN THE PAST 12 MONTHS, HAS THE LACK OF TRANSPORTATION KEPT YOU FROM MEDICAL APPOINTMENTS OR FROM GETTING MEDICATIONS?: NO

## 2022-05-31 ASSESSMENT — LIFESTYLE VARIABLES
HOW OFTEN DO YOU HAVE SIX OR MORE DRINKS ON ONE OCCASION: LESS THAN MONTHLY
AUDIT-C TOTAL SCORE: 3
HOW OFTEN DO YOU HAVE A DRINK CONTAINING ALCOHOL: 2-4 TIMES A MONTH
SKIP TO QUESTIONS 9-10: 0
HOW MANY STANDARD DRINKS CONTAINING ALCOHOL DO YOU HAVE ON A TYPICAL DAY: 1 OR 2

## 2022-05-31 ASSESSMENT — PATIENT HEALTH QUESTIONNAIRE - PHQ9
SUM OF ALL RESPONSES TO PHQ QUESTIONS 1-9: 2
10. IF YOU CHECKED OFF ANY PROBLEMS, HOW DIFFICULT HAVE THESE PROBLEMS MADE IT FOR YOU TO DO YOUR WORK, TAKE CARE OF THINGS AT HOME, OR GET ALONG WITH OTHER PEOPLE: NOT DIFFICULT AT ALL
SUM OF ALL RESPONSES TO PHQ QUESTIONS 1-9: 2

## 2022-05-31 ASSESSMENT — ENCOUNTER SYMPTOMS
DIARRHEA: 0
ABDOMINAL PAIN: 0
ARTHRALGIAS: 0
HEARTBURN: 1
DYSURIA: 0
MYALGIAS: 0
FREQUENCY: 0
WEAKNESS: 0
HEMATURIA: 0
NAUSEA: 0
JOINT SWELLING: 0
CHILLS: 0
HEADACHES: 0
FEVER: 0
CONSTIPATION: 0
DIZZINESS: 0
SHORTNESS OF BREATH: 0
EYE PAIN: 0
PALPITATIONS: 0
COUGH: 0
PARESTHESIAS: 0
HEMATOCHEZIA: 0
NERVOUS/ANXIOUS: 0
SORE THROAT: 0

## 2022-05-31 ASSESSMENT — SOCIAL DETERMINANTS OF HEALTH (SDOH)
ARE YOU MARRIED, WIDOWED, DIVORCED, SEPARATED, NEVER MARRIED, OR LIVING WITH A PARTNER?: LIVING WITH PARTNER
DO YOU BELONG TO ANY CLUBS OR ORGANIZATIONS SUCH AS CHURCH GROUPS UNIONS, FRATERNAL OR ATHLETIC GROUPS, OR SCHOOL GROUPS?: NO
IN A TYPICAL WEEK, HOW MANY TIMES DO YOU TALK ON THE PHONE WITH FAMILY, FRIENDS, OR NEIGHBORS?: ONCE A WEEK
HOW OFTEN DO YOU ATTEND CHURCH OR RELIGIOUS SERVICES?: 1 TO 4 TIMES PER YEAR
HOW OFTEN DO YOU GET TOGETHER WITH FRIENDS OR RELATIVES?: TWICE A WEEK

## 2022-05-31 NOTE — PATIENT INSTRUCTIONS
(F90.0) Attention deficit hyperactivity disorder (ADHD), predominantly inattentive type  (primary encounter diagnosis)  Comment: stable and doing well   Plan: amphetamine-dextroamphetamine (ADDERALL) 10 MG         tablet, amphetamine-dextroamphetamine         (ADDERALL) 10 MG tablet,         amphetamine-dextroamphetamine (ADDERALL) 10 MG         tablet            (Z11.4) Screening for HIV (human immunodeficiency virus)  Comment:   Plan: HIV Antigen Antibody Combo            (Z11.59) Need for hepatitis C screening test  Comment:   Plan: Hepatitis C Screen Reflex to HCV RNA Quant and         Genotype            (Z00.00) Routine general medical examination at a health care facility  Comment:   Plan:     (Z13.1) Screening for diabetes mellitus  Comment:   Plan: Hemoglobin A1c            (Z13.220) Screening, lipid  Comment:   Plan: Lipid Profile            (Z13.0) Screening for blood disease  Comment:   Plan: CBC with platelets            (Z11.3) Screen for STD (sexually transmitted disease)  Comment:   Plan: NEISSERIA GONORRHOEA PCR, CHLAMYDIA TRACHOMATIS        PCR              Preventive Health Recommendations  Male Ages 21 - 25     Yearly exam:             See your health care provider every year in order to  o   Review health changes.   o   Discuss preventive care.    o   Review your medicines if your doctor has prescribed any.  You should be tested each year for STDs (sexually transmitted diseases).   Talk to your provider about cholesterol testing.    If you are at risk for diabetes, you should have a diabetes test (fasting glucose).    Shots: Get a flu shot each year. Get a tetanus shot every 10 years.     Nutrition:  Eat at least 5 servings of fruits and vegetables daily.   Eat whole-grain bread, whole-wheat pasta and brown rice instead of white grains and rice.   Get adequate calcium and Vitamin D.     Lifestyle  Exercise for at least 150 minutes a week (30 minutes a day, 5 days a week). This will help you  control your weight and prevent disease.   Limit alcohol to one drink per day.   No smoking.   Wear sunscreen to prevent skin cancer.   See your dentist every six months for an exam and cleaning.

## 2022-05-31 NOTE — PROGRESS NOTES
SUBJECTIVE:   CC: Taiwo Mart is an 21 year old male who presents for preventative health visit.       Patient has been advised of split billing requirements and indicates understanding: Yes  Healthy Habits:   PHQ-2 Total Score: 0        Today's PHQ-2 Score:   PHQ-2 (  [employer]) 2022   Q1: Little interest or pleasure in doing things 0   Q2: Feeling down, depressed or hopeless 0   PHQ-2 Score 0   PHQ-2 Total Score (12-17 Years)- Positive if 3 or more points; Administer PHQ-A if positive -   Q1: Little interest or pleasure in doing things Not at all   Q2: Feeling down, depressed or hopeless Not at all   PHQ-2 Score 0       Abuse: Current or Past(Physical, Sexual or Emotional)- No  Do you feel safe in your environment? Yes    Have you ever done Advance Care Planning? (For example, a Health Directive, POLST, or a discussion with a medical provider or your loved ones about your wishes): No, advance care planning information given to patient to review.  Patient plans to discuss their wishes with loved ones or provider.      Social History     Tobacco Use     Smoking status: Former Smoker     Packs/day: 0.50     Types: Other     Quit date:      Years since quittin.4     Smokeless tobacco: Never Used     Tobacco comment: e cig   Substance Use Topics     Alcohol use: Not Currently     Comment: social         Alcohol Use 2022   Prescreen: >3 drinks/day or >7 drinks/week? No       Last PSA: No results found for: PSA    Reviewed orders with patient. Reviewed health maintenance and updated orders accordingly - Yes  BP Readings from Last 3 Encounters:   22 120/58   12/10/20 134/62   20 118/63    Wt Readings from Last 3 Encounters:   22 71.7 kg (158 lb)   12/10/20 71.5 kg (157 lb 9.6 oz)   20 69.2 kg (152 lb 9.6 oz)                  No Known Allergies    Reviewed and updated as needed this visit by clinical staff   Tobacco  Allergies  Meds   Med Hx  Surg Hx  Fam Hx  Soc Hx       "    Reviewed and updated as needed this visit by Provider                       Review of Systems  CONSTITUTIONAL: NEGATIVE for fever, chills, change in weight  INTEGUMENTARY/SKIN: NEGATIVE for worrisome rashes, moles or lesions  EYES: NEGATIVE for vision changes or irritation  ENT: NEGATIVE for ear, mouth and throat problems  RESP: NEGATIVE for significant cough or SOB  CV: NEGATIVE for chest pain, palpitations or peripheral edema  GI: NEGATIVE for nausea, abdominal pain, heartburn, or change in bowel habits   male: negative for dysuria, hematuria, decreased urinary stream, erectile dysfunction, urethral discharge  MUSCULOSKELETAL: NEGATIVE for significant arthralgias or myalgia  NEURO: NEGATIVE for weakness, dizziness or paresthesias  PSYCHIATRIC: NEGATIVE for changes in mood or affect    OBJECTIVE:   /58   Pulse 103   Temp 98.4  F (36.9  C) (Oral)   Resp 18   Ht 1.753 m (5' 9\")   Wt 71.7 kg (158 lb)   SpO2 100%   BMI 23.33 kg/m      Physical Exam  GENERAL: healthy, alert and no distress  EYES: Eyes grossly normal to inspection, PERRL and conjunctivae and sclerae normal  HENT: ear canals and TM's normal, nose and mouth without ulcers or lesions  NECK: no adenopathy, no asymmetry, masses, or scars and thyroid normal to palpation  RESP: lungs clear to auscultation - no rales, rhonchi or wheezes  CV: regular rate and rhythm, normal S1 S2, no S3 or S4, no murmur, click or rub, no peripheral edema and peripheral pulses strong  ABDOMEN: soft, nontender, no hepatosplenomegaly, no masses and bowel sounds normal  MS: no gross musculoskeletal defects noted, no edema  SKIN: no suspicious lesions or rashes  NEURO: Normal strength and tone, mentation intact and speech normal  PSYCH: mentation appears normal, affect normal/bright  LYMPH: no cervical, supraclavicular, axillary, or inguinal adenopathy    Diagnostic Test Results:  Labs reviewed in Epic    ASSESSMENT/PLAN:   (F90.0) Attention deficit hyperactivity " "disorder (ADHD), predominantly inattentive type  (primary encounter diagnosis)  Comment: Continue current medication   P. Follow-up in 6 months for medication recheck.  Continue non-medication behavioral modifications to improve attention and organization including having a regular schedule, breaking projects into smaller manageable time periods, work in quiet distraction free environment, achieve regular sleep pattern and healthy diet, remain physically active, as well as using lists, reminders, and calendars.    Plan: amphetamine-dextroamphetamine (ADDERALL) 10 MG         tablet, amphetamine-dextroamphetamine         (ADDERALL) 10 MG tablet,         amphetamine-dextroamphetamine (ADDERALL) 10 MG         tablet            (Z11.4) Screening for HIV (human immunodeficiency virus)  Comment:   Plan: HIV Antigen Antibody Combo            (Z11.59) Need for hepatitis C screening test  Comment:   Plan: Hepatitis C Screen Reflex to HCV RNA Quant and         Genotype            (Z00.00) Routine general medical examination at a health care facility  Comment:   Plan:     (Z13.1) Screening for diabetes mellitus  Comment:   Plan: Hemoglobin A1c            (Z13.220) Screening, lipid  Comment:   Plan: Lipid Profile            (Z13.0) Screening for blood disease  Comment:   Plan: CBC with platelets            (Z11.3) Screen for STD (sexually transmitted disease)  Comment:   Plan: NEISSERIA GONORRHOEA PCR, CHLAMYDIA TRACHOMATIS        PCR                  COUNSELING:   Reviewed preventive health counseling, as reflected in patient instructions    Estimated body mass index is 23.33 kg/m  as calculated from the following:    Height as of this encounter: 1.753 m (5' 9\").    Weight as of this encounter: 71.7 kg (158 lb).         He reports that he quit smoking about 16 months ago. His smoking use included other. He smoked 0.50 packs per day. He has never used smokeless tobacco.      Counseling Resources:  ATP IV Guidelines  Pooled " Cohorts Equation Calculator  FRAX Risk Assessment  ICSI Preventive Guidelines  Dietary Guidelines for Americans, 2010  USDA's MyPlate  ASA Prophylaxis  Lung CA Screening    Ramona Ann Aaseby-Aguilera, PA-C  [facility]        Answers for HPI/ROS submitted by the patient on 5/31/2022  If you checked off any problems, how difficult have these problems made it for you to do your work, take care of things at home, or get along with other people?: Not difficult at all  PHQ9 TOTAL SCORE: 2

## 2022-06-01 LAB
C TRACH DNA SPEC QL NAA+PROBE: NEGATIVE
HCV AB SERPL QL IA: NONREACTIVE
HIV 1+2 AB+HIV1 P24 AG SERPL QL IA: NONREACTIVE
N GONORRHOEA DNA SPEC QL NAA+PROBE: NEGATIVE

## 2022-08-11 ENCOUNTER — MYC REFILL (OUTPATIENT)
Dept: FAMILY MEDICINE | Facility: CLINIC | Age: 22
End: 2022-08-11

## 2022-08-11 DIAGNOSIS — F90.0 ATTENTION DEFICIT HYPERACTIVITY DISORDER (ADHD), PREDOMINANTLY INATTENTIVE TYPE: ICD-10-CM

## 2022-08-12 RX ORDER — DEXTROAMPHETAMINE SACCHARATE, AMPHETAMINE ASPARTATE, DEXTROAMPHETAMINE SULFATE AND AMPHETAMINE SULFATE 2.5; 2.5; 2.5; 2.5 MG/1; MG/1; MG/1; MG/1
10 TABLET ORAL 2 TIMES DAILY
Qty: 60 TABLET | Refills: 0 | Status: SHIPPED | OUTPATIENT
Start: 2022-08-12 | End: 2023-07-06

## 2022-12-22 ENCOUNTER — MYC REFILL (OUTPATIENT)
Dept: FAMILY MEDICINE | Facility: CLINIC | Age: 22
End: 2022-12-22

## 2022-12-22 DIAGNOSIS — F90.0 ATTENTION DEFICIT HYPERACTIVITY DISORDER (ADHD), PREDOMINANTLY INATTENTIVE TYPE: ICD-10-CM

## 2022-12-22 RX ORDER — DEXTROAMPHETAMINE SACCHARATE, AMPHETAMINE ASPARTATE, DEXTROAMPHETAMINE SULFATE AND AMPHETAMINE SULFATE 2.5; 2.5; 2.5; 2.5 MG/1; MG/1; MG/1; MG/1
10 TABLET ORAL 2 TIMES DAILY
Qty: 60 TABLET | Refills: 0 | Status: SHIPPED | OUTPATIENT
Start: 2022-12-22 | End: 2023-01-22

## 2022-12-22 NOTE — TELEPHONE ENCOUNTER
Routing refill request to provider for review/approval because:  Drug not on the [facility] refill protocol     Karin TRACY RN

## 2023-01-22 ENCOUNTER — MYC REFILL (OUTPATIENT)
Dept: FAMILY MEDICINE | Facility: CLINIC | Age: 23
End: 2023-01-22
Payer: COMMERCIAL

## 2023-01-22 DIAGNOSIS — F90.0 ATTENTION DEFICIT HYPERACTIVITY DISORDER (ADHD), PREDOMINANTLY INATTENTIVE TYPE: ICD-10-CM

## 2023-01-23 RX ORDER — DEXTROAMPHETAMINE SACCHARATE, AMPHETAMINE ASPARTATE, DEXTROAMPHETAMINE SULFATE AND AMPHETAMINE SULFATE 2.5; 2.5; 2.5; 2.5 MG/1; MG/1; MG/1; MG/1
10 TABLET ORAL 2 TIMES DAILY
Qty: 60 TABLET | Refills: 0 | Status: SHIPPED | OUTPATIENT
Start: 2023-01-23 | End: 2023-02-23

## 2023-02-23 ENCOUNTER — MYC REFILL (OUTPATIENT)
Dept: FAMILY MEDICINE | Facility: CLINIC | Age: 23
End: 2023-02-23
Payer: COMMERCIAL

## 2023-02-23 DIAGNOSIS — F90.0 ATTENTION DEFICIT HYPERACTIVITY DISORDER (ADHD), PREDOMINANTLY INATTENTIVE TYPE: ICD-10-CM

## 2023-02-23 RX ORDER — DEXTROAMPHETAMINE SACCHARATE, AMPHETAMINE ASPARTATE, DEXTROAMPHETAMINE SULFATE AND AMPHETAMINE SULFATE 2.5; 2.5; 2.5; 2.5 MG/1; MG/1; MG/1; MG/1
10 TABLET ORAL 2 TIMES DAILY
Qty: 60 TABLET | Refills: 0 | Status: SHIPPED | OUTPATIENT
Start: 2023-02-23 | End: 2023-03-23

## 2023-02-23 NOTE — TELEPHONE ENCOUNTER
Routing refill request to provider for review/approval because:  Drug not active on patient's medication list  Patient needs to be seen because:  past due for ADHD visit.    LOV 8/2022    [facility] sent requesting appt (was due in November 2022)  Michael MAYORGA RN, BSN

## 2023-03-23 ENCOUNTER — MYC REFILL (OUTPATIENT)
Dept: FAMILY MEDICINE | Facility: CLINIC | Age: 23
End: 2023-03-23
Payer: COMMERCIAL

## 2023-03-23 DIAGNOSIS — F90.0 ATTENTION DEFICIT HYPERACTIVITY DISORDER (ADHD), PREDOMINANTLY INATTENTIVE TYPE: ICD-10-CM

## 2023-03-23 RX ORDER — DEXTROAMPHETAMINE SACCHARATE, AMPHETAMINE ASPARTATE, DEXTROAMPHETAMINE SULFATE AND AMPHETAMINE SULFATE 2.5; 2.5; 2.5; 2.5 MG/1; MG/1; MG/1; MG/1
10 TABLET ORAL 2 TIMES DAILY
Qty: 60 TABLET | Refills: 0 | Status: SHIPPED | OUTPATIENT
Start: 2023-03-23 | End: 2023-04-23

## 2023-04-22 ENCOUNTER — HEALTH MAINTENANCE LETTER (OUTPATIENT)
Age: 23
End: 2023-04-22

## 2023-04-23 ENCOUNTER — MYC REFILL (OUTPATIENT)
Dept: FAMILY MEDICINE | Facility: CLINIC | Age: 23
End: 2023-04-23
Payer: COMMERCIAL

## 2023-04-23 DIAGNOSIS — F90.0 ATTENTION DEFICIT HYPERACTIVITY DISORDER (ADHD), PREDOMINANTLY INATTENTIVE TYPE: ICD-10-CM

## 2023-04-25 RX ORDER — DEXTROAMPHETAMINE SACCHARATE, AMPHETAMINE ASPARTATE, DEXTROAMPHETAMINE SULFATE AND AMPHETAMINE SULFATE 2.5; 2.5; 2.5; 2.5 MG/1; MG/1; MG/1; MG/1
10 TABLET ORAL 2 TIMES DAILY
Qty: 60 TABLET | Refills: 0 | Status: SHIPPED | OUTPATIENT
Start: 2023-04-25 | End: 2023-05-21

## 2023-05-01 ENCOUNTER — PATIENT OUTREACH (OUTPATIENT)
Dept: CARE COORDINATION | Facility: CLINIC | Age: 23
End: 2023-05-01
Payer: COMMERCIAL

## 2023-05-15 ENCOUNTER — PATIENT OUTREACH (OUTPATIENT)
Dept: CARE COORDINATION | Facility: CLINIC | Age: 23
End: 2023-05-15
Payer: COMMERCIAL

## 2023-05-21 ENCOUNTER — MYC REFILL (OUTPATIENT)
Dept: FAMILY MEDICINE | Facility: CLINIC | Age: 23
End: 2023-05-21
Payer: COMMERCIAL

## 2023-05-21 DIAGNOSIS — F90.0 ATTENTION DEFICIT HYPERACTIVITY DISORDER (ADHD), PREDOMINANTLY INATTENTIVE TYPE: ICD-10-CM

## 2023-05-22 RX ORDER — DEXTROAMPHETAMINE SACCHARATE, AMPHETAMINE ASPARTATE, DEXTROAMPHETAMINE SULFATE AND AMPHETAMINE SULFATE 2.5; 2.5; 2.5; 2.5 MG/1; MG/1; MG/1; MG/1
10 TABLET ORAL 2 TIMES DAILY
Qty: 60 TABLET | Refills: 0 | Status: SHIPPED | OUTPATIENT
Start: 2023-05-22 | End: 2023-07-06

## 2023-05-22 NOTE — TELEPHONE ENCOUNTER
Routing refill request to provider for review/approval because:  Drug not on the [facility] refill protocol   Michael Gould RN, BSN      Pt has not been seen since 5/31/2022

## 2023-07-06 ENCOUNTER — VIRTUAL VISIT (OUTPATIENT)
Dept: FAMILY MEDICINE | Facility: CLINIC | Age: 23
End: 2023-07-06
Payer: COMMERCIAL

## 2023-07-06 DIAGNOSIS — F90.0 ATTENTION DEFICIT HYPERACTIVITY DISORDER (ADHD), PREDOMINANTLY INATTENTIVE TYPE: ICD-10-CM

## 2023-07-06 PROBLEM — F32.1 MAJOR DEPRESSIVE DISORDER, SINGLE EPISODE, MODERATE (H): Status: RESOLVED | Noted: 2022-03-11 | Resolved: 2023-07-06

## 2023-07-06 PROCEDURE — 99213 OFFICE O/P EST LOW 20 MIN: CPT | Mod: VID | Performed by: FAMILY MEDICINE

## 2023-07-06 RX ORDER — DEXTROAMPHETAMINE SACCHARATE, AMPHETAMINE ASPARTATE, DEXTROAMPHETAMINE SULFATE AND AMPHETAMINE SULFATE 2.5; 2.5; 2.5; 2.5 MG/1; MG/1; MG/1; MG/1
10 TABLET ORAL 2 TIMES DAILY
Qty: 60 TABLET | Refills: 0 | Status: CANCELLED | OUTPATIENT
Start: 2023-07-06

## 2023-07-06 RX ORDER — DEXTROAMPHETAMINE SACCHARATE, AMPHETAMINE ASPARTATE, DEXTROAMPHETAMINE SULFATE AND AMPHETAMINE SULFATE 2.5; 2.5; 2.5; 2.5 MG/1; MG/1; MG/1; MG/1
10 TABLET ORAL 2 TIMES DAILY
Qty: 60 TABLET | Refills: 0 | Status: SHIPPED | OUTPATIENT
Start: 2023-08-06 | End: 2023-09-05

## 2023-07-06 RX ORDER — DEXTROAMPHETAMINE SACCHARATE, AMPHETAMINE ASPARTATE, DEXTROAMPHETAMINE SULFATE AND AMPHETAMINE SULFATE 2.5; 2.5; 2.5; 2.5 MG/1; MG/1; MG/1; MG/1
10 TABLET ORAL 2 TIMES DAILY
Qty: 60 TABLET | Refills: 0 | Status: SHIPPED | OUTPATIENT
Start: 2023-07-06 | End: 2023-08-05

## 2023-07-06 RX ORDER — DEXTROAMPHETAMINE SACCHARATE, AMPHETAMINE ASPARTATE, DEXTROAMPHETAMINE SULFATE AND AMPHETAMINE SULFATE 2.5; 2.5; 2.5; 2.5 MG/1; MG/1; MG/1; MG/1
10 TABLET ORAL 2 TIMES DAILY
Qty: 60 TABLET | Refills: 0 | Status: SHIPPED | OUTPATIENT
Start: 2023-09-06 | End: 2023-10-06

## 2023-07-06 ASSESSMENT — PATIENT HEALTH QUESTIONNAIRE - PHQ9
SUM OF ALL RESPONSES TO PHQ QUESTIONS 1-9: 3
10. IF YOU CHECKED OFF ANY PROBLEMS, HOW DIFFICULT HAVE THESE PROBLEMS MADE IT FOR YOU TO DO YOUR WORK, TAKE CARE OF THINGS AT HOME, OR GET ALONG WITH OTHER PEOPLE: SOMEWHAT DIFFICULT
SUM OF ALL RESPONSES TO PHQ QUESTIONS 1-9: 3

## 2023-07-06 NOTE — PROGRESS NOTES
Taiwo is a 22 year old who is being evaluated via a billable video visit.      How would you like to obtain your AVS? MyChart  If the video visit is dropped, the invitation should be resent by: Text to cell phone: [phone]  Will anyone else be joining your video visit? No        Assessment & Plan     Attention deficit hyperactivity disorder (ADHD), predominantly inattentive type - stable, refills. Discussed need for every 6 month visit to refill controlled substances.   - amphetamine-dextroamphetamine (ADDERALL) 10 MG tablet; Take 1 tablet (10 mg) by mouth 2 times daily for 30 days  - amphetamine-dextroamphetamine (ADDERALL) 10 MG tablet; Take 1 tablet (10 mg) by mouth 2 times daily for 30 days  - amphetamine-dextroamphetamine (ADDERALL) 10 MG tablet; Take 1 tablet (10 mg) by mouth 2 times daily for 30 days    Dulce Maria Faulkner MD  [facility]   Taiwo is a 22 year old, presenting for the following health issues:  Recheck Medication (Needs refills on his Adderall. Working well.  Has been on it for about 2-3 years now. )        7/6/2023     8:50 AM   Additional Questions   Roomed by Vida Peña CMA   Accompanied by Self     History of Present Illness       Reason for visit:  To get my adderall refilled    He eats 0-1 servings of fruits and vegetables daily.He consumes 2 sweetened beverage(s) daily.He exercises with enough effort to increase his heart rate 10 to 19 minutes per day.  He exercises with enough effort to increase his heart rate 4 days per week. He is missing 4 dose(s) of medications per week.  He is not taking prescribed medications regularly due to other.    Today's PHQ-9         PHQ-9 Total Score: 3    PHQ-9 Q9 Thoughts of better off dead/self-harm past 2 weeks :   Not at all    How difficult have these problems made it for you to do your work, take care of things at home, or get along with other people: Somewhat difficult       Medication Followup of  "Adderall    Taking Medication as prescribed: yes    Side Effects:  None    Medication Helping Symptoms:  yes      Has been out of Adderall for the past 4 days.   Using it daily - typically 1 tablet in the AM and then he will take the second later in the day if needed or will halve it.      Review of Systems   Constitutional, HEENT, cardiovascular, pulmonary, gi and gu systems are negative, except as otherwise noted.      Objective    Vitals - Patient Reported  Weight (Patient Reported): 73.9 kg (163 lb)  Height (Patient Reported): 175.3 cm (5' 9\")  BMI (Based on Pt Reported Ht/Wt): 24.07  Pain Score: No Pain (0)        Physical Exam   GENERAL: Healthy, alert and no distress  EYES: Eyes grossly normal to inspection.  No discharge or erythema, or obvious scleral/conjunctival abnormalities.  RESP: No audible wheeze, cough, or visible cyanosis.  No visible retractions or increased work of breathing.    SKIN: Visible skin clear. No significant rash, abnormal pigmentation or lesions.  NEURO: Cranial nerves grossly intact.  Mentation and speech appropriate for age.  PSYCH: Mentation appears normal, affect normal/bright, judgement and insight intact, normal speech and appearance well-groomed.        Video-Visit Details    Type of service:  Video Visit     Originating Location (pt. Location): Home    Distant Location (provider location):  Off-site  Platform used for Video Visit: Drea    "

## 2023-07-15 ENCOUNTER — HEALTH MAINTENANCE LETTER (OUTPATIENT)
Age: 23
End: 2023-07-15

## 2023-10-06 ENCOUNTER — OFFICE VISIT (OUTPATIENT)
Dept: FAMILY MEDICINE | Facility: CLINIC | Age: 23
End: 2023-10-06
Payer: COMMERCIAL

## 2023-10-06 VITALS
SYSTOLIC BLOOD PRESSURE: 94 MMHG | TEMPERATURE: 98.3 F | WEIGHT: 163 LBS | OXYGEN SATURATION: 98 % | BODY MASS INDEX: 24.14 KG/M2 | HEART RATE: 94 BPM | DIASTOLIC BLOOD PRESSURE: 60 MMHG | HEIGHT: 69 IN | RESPIRATION RATE: 16 BRPM

## 2023-10-06 DIAGNOSIS — Z13.0 SCREENING FOR BLOOD DISEASE: ICD-10-CM

## 2023-10-06 DIAGNOSIS — Z13.29 SCREENING FOR THYROID DISORDER: ICD-10-CM

## 2023-10-06 DIAGNOSIS — Z13.220 SCREENING, LIPID: ICD-10-CM

## 2023-10-06 DIAGNOSIS — F90.0 ATTENTION DEFICIT HYPERACTIVITY DISORDER (ADHD), PREDOMINANTLY INATTENTIVE TYPE: ICD-10-CM

## 2023-10-06 DIAGNOSIS — Z13.1 SCREENING FOR DIABETES MELLITUS: ICD-10-CM

## 2023-10-06 DIAGNOSIS — M54.2 CERVICAL PAIN: ICD-10-CM

## 2023-10-06 DIAGNOSIS — Z00.00 ROUTINE GENERAL MEDICAL EXAMINATION AT A HEALTH CARE FACILITY: Primary | ICD-10-CM

## 2023-10-06 PROCEDURE — 99395 PREV VISIT EST AGE 18-39: CPT | Performed by: PHYSICIAN ASSISTANT

## 2023-10-06 RX ORDER — DEXTROAMPHETAMINE SACCHARATE, AMPHETAMINE ASPARTATE, DEXTROAMPHETAMINE SULFATE AND AMPHETAMINE SULFATE 2.5; 2.5; 2.5; 2.5 MG/1; MG/1; MG/1; MG/1
10 TABLET ORAL 2 TIMES DAILY
Qty: 60 TABLET | Refills: 0 | Status: SHIPPED | OUTPATIENT
Start: 2023-12-07 | End: 2024-01-08

## 2023-10-06 RX ORDER — DEXTROAMPHETAMINE SACCHARATE, AMPHETAMINE ASPARTATE, DEXTROAMPHETAMINE SULFATE AND AMPHETAMINE SULFATE 2.5; 2.5; 2.5; 2.5 MG/1; MG/1; MG/1; MG/1
10 TABLET ORAL 2 TIMES DAILY
Qty: 60 TABLET | Refills: 0 | Status: SHIPPED | OUTPATIENT
Start: 2023-10-06 | End: 2023-11-05

## 2023-10-06 RX ORDER — DEXTROAMPHETAMINE SACCHARATE, AMPHETAMINE ASPARTATE, DEXTROAMPHETAMINE SULFATE AND AMPHETAMINE SULFATE 2.5; 2.5; 2.5; 2.5 MG/1; MG/1; MG/1; MG/1
10 TABLET ORAL 2 TIMES DAILY
Qty: 60 TABLET | Refills: 0 | Status: SHIPPED | OUTPATIENT
Start: 2023-11-06 | End: 2023-12-06

## 2023-10-06 SDOH — HEALTH STABILITY: PHYSICAL HEALTH: ON AVERAGE, HOW MANY MINUTES DO YOU ENGAGE IN EXERCISE AT THIS LEVEL?: 20 MIN

## 2023-10-06 SDOH — HEALTH STABILITY: PHYSICAL HEALTH: ON AVERAGE, HOW MANY DAYS PER WEEK DO YOU ENGAGE IN MODERATE TO STRENUOUS EXERCISE (LIKE A BRISK WALK)?: 3 DAYS

## 2023-10-06 ASSESSMENT — ENCOUNTER SYMPTOMS
PARESTHESIAS: 0
DIZZINESS: 0
PALPITATIONS: 0
FREQUENCY: 0
NAUSEA: 0
DYSURIA: 0
WEAKNESS: 0
SORE THROAT: 0
FEVER: 0
MYALGIAS: 0
HEMATOCHEZIA: 0
HEADACHES: 0
HEMATURIA: 0
COUGH: 0
SHORTNESS OF BREATH: 0
HEARTBURN: 0
JOINT SWELLING: 0
CONSTIPATION: 0
EYE PAIN: 0
ARTHRALGIAS: 0
ABDOMINAL PAIN: 0
CHILLS: 0
NERVOUS/ANXIOUS: 0
DIARRHEA: 0

## 2023-10-06 ASSESSMENT — ANXIETY QUESTIONNAIRES
3. WORRYING TOO MUCH ABOUT DIFFERENT THINGS: NOT AT ALL
6. BECOMING EASILY ANNOYED OR IRRITABLE: NOT AT ALL
4. TROUBLE RELAXING: NOT AT ALL
7. FEELING AFRAID AS IF SOMETHING AWFUL MIGHT HAPPEN: NOT AT ALL
5. BEING SO RESTLESS THAT IT IS HARD TO SIT STILL: NOT AT ALL
GAD7 TOTAL SCORE: 2
2. NOT BEING ABLE TO STOP OR CONTROL WORRYING: SEVERAL DAYS
1. FEELING NERVOUS, ANXIOUS, OR ON EDGE: SEVERAL DAYS
IF YOU CHECKED OFF ANY PROBLEMS ON THIS QUESTIONNAIRE, HOW DIFFICULT HAVE THESE PROBLEMS MADE IT FOR YOU TO DO YOUR WORK, TAKE CARE OF THINGS AT HOME, OR GET ALONG WITH OTHER PEOPLE: SOMEWHAT DIFFICULT
GAD7 TOTAL SCORE: 2

## 2023-10-06 ASSESSMENT — SOCIAL DETERMINANTS OF HEALTH (SDOH)
DO YOU BELONG TO ANY CLUBS OR ORGANIZATIONS SUCH AS CHURCH GROUPS UNIONS, FRATERNAL OR ATHLETIC GROUPS, OR SCHOOL GROUPS?: NO
HOW OFTEN DO YOU GET TOGETHER WITH FRIENDS OR RELATIVES?: TWICE A WEEK
HOW OFTEN DO YOU ATTEND CHURCH OR RELIGIOUS SERVICES?: NEVER
IN A TYPICAL WEEK, HOW MANY TIMES DO YOU TALK ON THE PHONE WITH FAMILY, FRIENDS, OR NEIGHBORS?: THREE TIMES A WEEK
HOW OFTEN DO YOU ATTENT MEETINGS OF THE CLUB OR ORGANIZATION YOU BELONG TO?: NEVER

## 2023-10-06 ASSESSMENT — LIFESTYLE VARIABLES
SKIP TO QUESTIONS 9-10: 0
HOW MANY STANDARD DRINKS CONTAINING ALCOHOL DO YOU HAVE ON A TYPICAL DAY: 3 OR 4
HOW OFTEN DO YOU HAVE A DRINK CONTAINING ALCOHOL: 2-4 TIMES A MONTH
AUDIT-C TOTAL SCORE: 4
HOW OFTEN DO YOU HAVE SIX OR MORE DRINKS ON ONE OCCASION: LESS THAN MONTHLY

## 2023-10-06 ASSESSMENT — PATIENT HEALTH QUESTIONNAIRE - PHQ9
SUM OF ALL RESPONSES TO PHQ QUESTIONS 1-9: 6
SUM OF ALL RESPONSES TO PHQ QUESTIONS 1-9: 6
10. IF YOU CHECKED OFF ANY PROBLEMS, HOW DIFFICULT HAVE THESE PROBLEMS MADE IT FOR YOU TO DO YOUR WORK, TAKE CARE OF THINGS AT HOME, OR GET ALONG WITH OTHER PEOPLE: SOMEWHAT DIFFICULT

## 2023-10-06 ASSESSMENT — PAIN SCALES - GENERAL: PAINLEVEL: MILD PAIN (2)

## 2023-10-06 NOTE — PROGRESS NOTES
SUBJECTIVE:   CC: Taiwo is an 22 year old who presents for preventative health visit.       10/6/2023     3:26 PM   Additional Questions   Roomed by Vida Peña CMA   Accompanied by Self       Healthy Habits:     Getting at least 3 servings of Calcium per day:  Yes    Bi-annual eye exam:  NO    Dental care twice a year:  NO    Sleep apnea or symptoms of sleep apnea:  None    Diet:  Regular (no restrictions)    Frequency of exercise:  1 day/week    Duration of exercise:  15-30 minutes    Taking medications regularly:  Yes    Medication side effects:  None    Additional concerns today:  No      Today's PHQ-9 Score:       10/6/2023     3:03 PM   PHQ-9 SCORE   PHQ-9 Total Score MyChart 6 (Mild depression)   PHQ-9 Total Score 6             ADHD: Taking  adderall 10 mg bid  Working well at this dosage.  Denies tics, palpitations, insomnia or trouble with appetite       Social History     Tobacco Use    Smoking status: Former     Packs/day: 0.50     Types: Cigarettes, Other     Quit date:      Years since quittin.7    Smokeless tobacco: Never    Tobacco comments:     Nicotine Mints   Substance Use Topics    Alcohol use: Not Currently     Comment: social             10/6/2023     3:08 PM   Alcohol Use   Prescreen: >3 drinks/day or >7 drinks/week? Yes   AUDIT SCORE  5       Last PSA: No results found for: PSA    Reviewed orders with patient. Reviewed health maintenance and updated orders accordingly - Yes  BP Readings from Last 3 Encounters:   10/06/23 94/60   22 120/58   12/10/20 134/62    Wt Readings from Last 3 Encounters:   10/06/23 73.9 kg (163 lb)   22 71.7 kg (158 lb)   12/10/20 71.5 kg (157 lb 9.6 oz)                  Patient Active Problem List   Diagnosis    Anxiety    Tobacco use disorder    Attention deficit hyperactivity disorder (ADHD), predominantly inattentive type     No past surgical history on file.    Social History     Tobacco Use    Smoking status: Former     Packs/day: 0.50      Types: Cigarettes, Other     Quit date:      Years since quittin.7    Smokeless tobacco: Never    Tobacco comments:     Nicotine Mints   Substance Use Topics    Alcohol use: Not Currently     Comment: social     Family History   Problem Relation Age of Onset    Allergies Mother     Anxiety Disorder Mother     Family History Negative Father     No Known Problems Sister          Current Outpatient Medications   Medication Sig Dispense Refill    amphetamine-dextroamphetamine (ADDERALL) 10 MG tablet Take 1 tablet (10 mg) by mouth 2 times daily for 30 days 60 tablet 0    [START ON 2023] amphetamine-dextroamphetamine (ADDERALL) 10 MG tablet Take 1 tablet (10 mg) by mouth 2 times daily for 30 days 60 tablet 0    [START ON 2023] amphetamine-dextroamphetamine (ADDERALL) 10 MG tablet Take 1 tablet (10 mg) by mouth 2 times daily for 30 days 60 tablet 0    amphetamine-dextroamphetamine (ADDERALL) 10 MG tablet Take 1 tablet (10 mg) by mouth 2 times daily for 30 days 60 tablet 0     No Known Allergies    Reviewed and updated as needed this visit by clinical staff   Tobacco  Allergies  Meds              Reviewed and updated as needed this visit by Provider                     Review of Systems   Constitutional:  Negative for chills and fever.   HENT:  Negative for congestion, ear pain, hearing loss and sore throat.    Eyes:  Negative for pain and visual disturbance.   Respiratory:  Negative for cough and shortness of breath.    Cardiovascular:  Negative for chest pain, palpitations and peripheral edema.   Gastrointestinal:  Negative for abdominal pain, constipation, diarrhea, heartburn, hematochezia and nausea.   Genitourinary:  Negative for dysuria, frequency, genital sores, hematuria, impotence, penile discharge and urgency.   Musculoskeletal:  Negative for arthralgias, joint swelling and myalgias.   Skin:  Negative for rash.   Neurological:  Negative for dizziness, weakness, headaches and paresthesias.  "  Psychiatric/Behavioral:  Negative for mood changes. The patient is not nervous/anxious.          OBJECTIVE:   BP 94/60 (BP Location: Right arm, Patient Position: Sitting, Cuff Size: Adult Regular)   Pulse 94   Temp 98.3  F (36.8  C) (Oral)   Resp 16   Ht 1.753 m (5' 9\")   Wt 73.9 kg (163 lb)   SpO2 98%   BMI 24.07 kg/m      Physical Exam  GENERAL: healthy, alert and no distress  EYES: Eyes grossly normal to inspection, PERRL and conjunctivae and sclerae normal  HENT: ear canals and TM's normal, nose and mouth without ulcers or lesions  NECK: no adenopathy, no asymmetry, masses, or scars and thyroid normal to palpation  RESP: lungs clear to auscultation - no rales, rhonchi or wheezes  CV: regular rate and rhythm, normal S1 S2, no S3 or S4, no murmur, click or rub, no peripheral edema and peripheral pulses strong  ABDOMEN: soft, nontender, no hepatosplenomegaly, no masses and bowel sounds normal  MS: no gross musculoskeletal defects noted, no edema  SKIN: no suspicious lesions or rashes  NEURO: Normal strength and tone, mentation intact and speech normal  PSYCH: mentation appears normal, affect normal/bright    Diagnostic Test Results:  Labs reviewed in Epic    ASSESSMENT/PLAN:   (Z00.00) Routine general medical examination at a health care facility  (primary encounter diagnosis)  Comment:   Plan:     (Z13.1) Screening for diabetes mellitus  Comment:   Plan: Comprehensive metabolic panel            (Z13.220) Screening, lipid  Comment:   Plan: Lipid Profile            (Z13.29) Screening for thyroid disorder  Comment:   Plan: TSH with free T4 reflex            (Z13.0) Screening for blood disease  Comment:   Plan: CBC with platelets            (F90.0) Attention deficit hyperactivity disorder (ADHD), predominantly inattentive type  Comment: Patient given postdated prescriptions for 3 months time. Follow-up in 6 months for medication recheck.  Continue non-medication behavioral modifications to improve attention " and organization including having a regular schedule, breaking projects into smaller manageable time periods, work in quiet distraction free environment, achieve regular sleep pattern and healthy diet, remain physically active, as well as using lists, reminders, and calendars.     Plan: amphetamine-dextroamphetamine (ADDERALL) 10 MG         tablet, amphetamine-dextroamphetamine         (ADDERALL) 10 MG tablet,         amphetamine-dextroamphetamine (ADDERALL) 10 MG         tablet                  COUNSELING:   Reviewed preventive health counseling, as reflected in patient instructions       Regular exercise       Healthy diet/nutrition       Vision screening       Hearing screening        He reports that he quit smoking about 2 years ago. His smoking use included cigarettes and other. He smoked an average of .5 packs per day. He has never used smokeless tobacco.            Ramona Ann Aaseby-Aguilera, PA-C  [facility] HealthcareAnswers submitted by the patient for this visit:  Patient Health Questionnaire (Submitted on 10/6/2023)  If you checked off any problems, how difficult have these problems made it for you to do your work, take care of things at home, or get along with other people?: Somewhat difficult  PHQ9 TOTAL SCORE: 6  MERT-7 (Submitted on 10/6/2023)  MERT 7 TOTAL SCORE: 2

## 2023-10-16 ENCOUNTER — LAB (OUTPATIENT)
Dept: LAB | Facility: CLINIC | Age: 23
End: 2023-10-16
Payer: COMMERCIAL

## 2023-10-16 DIAGNOSIS — Z13.29 SCREENING FOR THYROID DISORDER: ICD-10-CM

## 2023-10-16 DIAGNOSIS — Z13.0 SCREENING FOR BLOOD DISEASE: ICD-10-CM

## 2023-10-16 DIAGNOSIS — Z13.220 SCREENING, LIPID: ICD-10-CM

## 2023-10-16 DIAGNOSIS — Z13.1 SCREENING FOR DIABETES MELLITUS: ICD-10-CM

## 2023-10-16 DIAGNOSIS — R74.8 ELEVATED LIVER ENZYMES: Primary | ICD-10-CM

## 2023-10-16 LAB
ALBUMIN SERPL BCG-MCNC: 4.3 G/DL (ref 3.5–5.2)
ALP SERPL-CCNC: 62 U/L (ref 40–129)
ALT SERPL W P-5'-P-CCNC: 129 U/L (ref 0–70)
ANION GAP SERPL CALCULATED.3IONS-SCNC: 9 MMOL/L (ref 7–15)
AST SERPL W P-5'-P-CCNC: 260 U/L (ref 0–45)
BILIRUB SERPL-MCNC: 0.3 MG/DL
BUN SERPL-MCNC: 11.8 MG/DL (ref 6–20)
CALCIUM SERPL-MCNC: 9.3 MG/DL (ref 8.6–10)
CHLORIDE SERPL-SCNC: 104 MMOL/L (ref 98–107)
CHOLEST SERPL-MCNC: 192 MG/DL
CREAT SERPL-MCNC: 0.82 MG/DL (ref 0.67–1.17)
DEPRECATED HCO3 PLAS-SCNC: 25 MMOL/L (ref 22–29)
EGFRCR SERPLBLD CKD-EPI 2021: >90 ML/MIN/1.73M2
ERYTHROCYTE [DISTWIDTH] IN BLOOD BY AUTOMATED COUNT: 12 % (ref 10–15)
GLUCOSE SERPL-MCNC: 92 MG/DL (ref 70–99)
HCT VFR BLD AUTO: 42.7 % (ref 40–53)
HDLC SERPL-MCNC: 47 MG/DL
HGB BLD-MCNC: 14.5 G/DL (ref 13.3–17.7)
LDLC SERPL CALC-MCNC: 133 MG/DL
MCH RBC QN AUTO: 30.5 PG (ref 26.5–33)
MCHC RBC AUTO-ENTMCNC: 34 G/DL (ref 31.5–36.5)
MCV RBC AUTO: 90 FL (ref 78–100)
NONHDLC SERPL-MCNC: 145 MG/DL
PLATELET # BLD AUTO: 180 10E3/UL (ref 150–450)
POTASSIUM SERPL-SCNC: 4.5 MMOL/L (ref 3.4–5.3)
PROT SERPL-MCNC: 6.4 G/DL (ref 6.4–8.3)
RBC # BLD AUTO: 4.76 10E6/UL (ref 4.4–5.9)
SODIUM SERPL-SCNC: 138 MMOL/L (ref 135–145)
TRIGL SERPL-MCNC: 62 MG/DL
TSH SERPL DL<=0.005 MIU/L-ACNC: 1.78 UIU/ML (ref 0.3–4.2)
WBC # BLD AUTO: 4 10E3/UL (ref 4–11)

## 2023-10-16 PROCEDURE — 80053 COMPREHEN METABOLIC PANEL: CPT

## 2023-10-16 PROCEDURE — 36415 COLL VENOUS BLD VENIPUNCTURE: CPT

## 2023-10-16 PROCEDURE — 84443 ASSAY THYROID STIM HORMONE: CPT

## 2023-10-16 PROCEDURE — 85027 COMPLETE CBC AUTOMATED: CPT

## 2023-10-16 PROCEDURE — 80061 LIPID PANEL: CPT

## 2023-11-06 ENCOUNTER — LAB (OUTPATIENT)
Dept: LAB | Facility: CLINIC | Age: 23
End: 2023-11-06
Payer: COMMERCIAL

## 2023-11-06 DIAGNOSIS — R74.8 ELEVATED LIVER ENZYMES: ICD-10-CM

## 2023-11-06 LAB
ALBUMIN SERPL BCG-MCNC: 4.5 G/DL (ref 3.5–5.2)
ALP SERPL-CCNC: 64 U/L (ref 40–129)
ALT SERPL W P-5'-P-CCNC: 41 U/L (ref 0–70)
AST SERPL W P-5'-P-CCNC: 30 U/L (ref 0–45)
BILIRUB DIRECT SERPL-MCNC: <0.2 MG/DL (ref 0–0.3)
BILIRUB SERPL-MCNC: 0.6 MG/DL
PROT SERPL-MCNC: 6.7 G/DL (ref 6.4–8.3)

## 2023-11-06 PROCEDURE — 36415 COLL VENOUS BLD VENIPUNCTURE: CPT

## 2023-11-06 PROCEDURE — 80076 HEPATIC FUNCTION PANEL: CPT

## 2023-11-21 ENCOUNTER — THERAPY VISIT (OUTPATIENT)
Dept: PHYSICAL THERAPY | Facility: CLINIC | Age: 23
End: 2023-11-21
Attending: PHYSICIAN ASSISTANT
Payer: COMMERCIAL

## 2023-11-21 DIAGNOSIS — M54.2 CERVICAL PAIN: ICD-10-CM

## 2023-11-21 PROCEDURE — 97161 PT EVAL LOW COMPLEX 20 MIN: CPT | Mod: GP | Performed by: PHYSICAL THERAPIST

## 2023-11-21 PROCEDURE — 97110 THERAPEUTIC EXERCISES: CPT | Mod: GP | Performed by: PHYSICAL THERAPIST

## 2023-11-21 NOTE — PROGRESS NOTES
PHYSICAL THERAPY EVALUATION  Type of Visit: Evaluation    See electronic medical record for Abuse and Falls Screening details.    Subjective       Presenting condition or subjective complaint: shoulder neck tightness, headaches  Date of onset: 10/06/23 (MD order date, 1+ yr hx)    Relevant medical history:     Dates & types of surgery: n/a    Prior diagnostic imaging/testing results:       Prior therapy history for the same diagnosis, illness or injury: Yes massage    Prior Level of Function  Transfers: Independent  Ambulation: Independent  ADL: Independent  IADL: Housekeeping, Work, Yard work    Living Environment  Social support: With a significant other or spouse   Type of home: House   Stairs to enter the home: No       Ramp: No   Stairs inside the home: Yes 12 Is there a railing: Yes   Help at home: None  Equipment owned:       Employment: Yes realtor  Hobbies/Interests: darts    Patient goals for therapy: decrease tension/pain, not have headaches    Pain assessment:  5/10 anterior and lateral neck tightness and HA daily     Objective   CERVICAL SPINE EVALUATION  PAIN: Pain is Exacerbated By: computer work/poor posture  POSTURE: Standing Posture: Rounded shoulders, Forward head, Lordosis increased, Thoracic kyphosis increased, Pes planus  Sitting Posture: Rounded shoulders, Forward head  ROM:  all cervical AROM WNL, thoracic extension and rotation 75%  MYOTOMES: WNL  FLEXIBILITY:  pt reports feeling neck tightness with rotation and lateral flexion but good mobility noted    SPECIAL TESTS: deferred due to focus on posture/tspine mobility today      Assessment & Plan   CLINICAL IMPRESSIONS  Medical Diagnosis: cervicalgia    Treatment Diagnosis: thoracic spine tightness, periscapular dyskinesia   Impression/Assessment: Patient is a 23 year old male with neck pain and headache complaints, no radiculopathy.  The following significant findings have been identified: Pain, Decreased ROM/flexibility, Decreased joint  mobility, Decreased strength, Decreased proprioception, Impaired muscle performance, Decreased activity tolerance, and Impaired posture. These impairments interfere with their ability to perform self care tasks, work tasks, recreational activities, and household chores as compared to previous level of function.     Clinical Decision Making (Complexity):  Clinical Presentation: Stable/Uncomplicated  Clinical Presentation Rationale: based on medical and personal factors listed in PT evaluation  Clinical Decision Making (Complexity): Low complexity    PLAN OF CARE  Treatment Interventions:  Interventions: Manual Therapy, Neuromuscular Re-education, Therapeutic Activity, Therapeutic Exercise, Self-Care/Home Management    Long Term Goals     PT Goal 1  Goal Identifier: HA  Goal Description: HA reduced to once/week or less  Rationale: to maximize safety and independence with performance of ADLs and functional tasks  Goal Progress: new goal  Target Date: 01/16/24      Frequency of Treatment: 1x/wk tapering to every other  Duration of Treatment: 8 weeks    Recommended Referrals to Other Professionals: n/a  Education Assessment:   Learner/Method: Patient    Risks and benefits of evaluation/treatment have been explained.   Patient/Family/caregiver agrees with Plan of Care.     Evaluation Time:     PT Eval, Low Complexity Minutes (94742): 20       Signing Clinician: Gerri Ann PT

## 2023-12-06 ENCOUNTER — THERAPY VISIT (OUTPATIENT)
Dept: PHYSICAL THERAPY | Facility: CLINIC | Age: 23
End: 2023-12-06
Payer: COMMERCIAL

## 2023-12-06 DIAGNOSIS — M54.2 CERVICAL PAIN: Primary | ICD-10-CM

## 2023-12-06 PROCEDURE — 97110 THERAPEUTIC EXERCISES: CPT | Mod: GP | Performed by: PHYSICAL THERAPIST

## 2023-12-06 PROCEDURE — 97140 MANUAL THERAPY 1/> REGIONS: CPT | Mod: GP | Performed by: PHYSICAL THERAPIST

## 2024-01-08 ENCOUNTER — MYC REFILL (OUTPATIENT)
Dept: FAMILY MEDICINE | Facility: CLINIC | Age: 24
End: 2024-01-08
Payer: COMMERCIAL

## 2024-01-08 DIAGNOSIS — F90.0 ATTENTION DEFICIT HYPERACTIVITY DISORDER (ADHD), PREDOMINANTLY INATTENTIVE TYPE: ICD-10-CM

## 2024-01-08 RX ORDER — DEXTROAMPHETAMINE SACCHARATE, AMPHETAMINE ASPARTATE, DEXTROAMPHETAMINE SULFATE AND AMPHETAMINE SULFATE 2.5; 2.5; 2.5; 2.5 MG/1; MG/1; MG/1; MG/1
10 TABLET ORAL 2 TIMES DAILY
Qty: 60 TABLET | Refills: 0 | Status: SHIPPED | OUTPATIENT
Start: 2024-01-08 | End: 2024-07-15

## 2024-01-08 NOTE — PROGRESS NOTES
"    DISCHARGE  Reason for Discharge: Patient has failed to schedule further appointments.  See Flowsheet below for status at last visit.    Equipment Issued: none    Discharge Plan: Patient to continue home program.    Referring Provider:  Ramona Ann Aaseby-Aguil*        12/06/23 0500   Appointment Info   Signing clinician's name / credentials Nik Mina, PT   Total/Authorized Visits ET 6 per PT   Visits Used 2   Medical Diagnosis cervicalgia   PT Tx Diagnosis thoracic spine tightness, periscapular dyskinesia   Other pertinent information HA most days, tried chiro not helpful, works out at gym with weights upper body, uses posture  at desk, has standing desk   Progress Note/Certification   Onset of illness/injury or Date of Surgery 10/06/23  (MD order date, 1+ yr hx)   Therapy Frequency 1x/wk tapering to every other   Predicted Duration 8 weeks   Progress Note Due Date 02/18/24   PT Goal 1   Goal Identifier HA   Goal Description HA reduced to once/week or less   Rationale to maximize safety and independence with performance of ADLs and functional tasks   Goal Progress new goal   Target Date 01/16/24   Subjective Report   Subjective Report No change in sxs.  Is doing HEP and working out his back more at the gym.  Has also developed left sided LBP.  Describes right neck pain and tightness > left with a right sided SO and orbital HA.  Develops through the day, daily.  LBP is constant, but mild at the moment and gets worse with sitting.   Objective Measures   Objective Measures Objective Measure 1   Objective Measure 1   Objective Measure scapulothoracic rhythm/mobility   Treatment Interventions (PT)   Interventions Therapeutic Procedure/Exercise;Therapeutic Activity;Manual Therapy   Therapeutic Procedure/Exercise   Therapeutic Procedures: strength, endurance, ROM, flexibillity minutes (62295) 30   PTRx Ther Proc 1 Thoracic Extension   PTRx Ther Proc 1 - Details seated x 10 - \"could feel stuck spots\"   PTRx " "Ther Proc 2 Prone Scapula T   PTRx Ther Proc 2 - Details cues for relaxing UT x 15   PTRx Ther Proc 3 Prone Head Lift   PTRx Ther Proc 3 - Details x 10   PTRx Ther Proc 4 Arm Slides   PTRx Ther Proc 4 - Details circles better than open book position for t spine pinching x 5 each way   Ther Proc 2 Seated cerv retr with ext   Ther Proc 2 - Details 10x   Ther Proc 3 Press ups   Ther Proc 3 - Details 10x   Ther Proc 4 Pec stretch in door   Ther Proc 4 - Details 3x10\"   Ther Proc 1 Cerv retr with OP   Ther Proc 1 - Details 10x3\"   Therapeutic Procedures Ther Proc 2;Ther Proc 3;Ther Proc 4;Ther Proc 5   Therapeutic Activity   PTRx Ther Act 1 Foam Roller Thoracic Extension   PTRx Ther Act 1 - Details you can also use this position for midback extension instead of seated in a low back chair- your choice or some of both   Manual Therapy   Manual Therapy: Mobilization, MFR, MLD, friction massage minutes (34740) 10   Manual Therapy Manual Therapy 2;Manual Therapy 3   Manual Therapy 1 Thor PA's   Manual Therapy 1 - Details 3   Manual Therapy 2 SO massage and release   Manual Therapy 2 - Details 3 min   Manual Therapy 3 Manual pec minor stretch   Manual Therapy 3 - Details 3x10\"   Education   Learner/Method Patient   Plan   Home program online ptrx   Plan for next session Recheck in 2 weeks   Comments   Comments also works out with weights in gym, suggested 2x back body to 1x chest/pressing for the next 2-3 weeks to calm down anterior and neck mm tightness and improve extensor mm recruitment   Total Session Time   Timed Code Treatment Minutes 40   Total Treatment Time (sum of timed and untimed services) 40       "

## 2024-02-14 ENCOUNTER — MYC REFILL (OUTPATIENT)
Dept: FAMILY MEDICINE | Facility: CLINIC | Age: 24
End: 2024-02-14
Payer: COMMERCIAL

## 2024-02-14 DIAGNOSIS — F90.0 ATTENTION DEFICIT HYPERACTIVITY DISORDER (ADHD), PREDOMINANTLY INATTENTIVE TYPE: ICD-10-CM

## 2024-02-14 RX ORDER — DEXTROAMPHETAMINE SACCHARATE, AMPHETAMINE ASPARTATE, DEXTROAMPHETAMINE SULFATE AND AMPHETAMINE SULFATE 2.5; 2.5; 2.5; 2.5 MG/1; MG/1; MG/1; MG/1
10 TABLET ORAL 2 TIMES DAILY
Qty: 60 TABLET | Refills: 0 | Status: CANCELLED | OUTPATIENT
Start: 2024-02-14

## 2024-02-14 RX ORDER — DEXTROAMPHETAMINE SACCHARATE, AMPHETAMINE ASPARTATE, DEXTROAMPHETAMINE SULFATE AND AMPHETAMINE SULFATE 2.5; 2.5; 2.5; 2.5 MG/1; MG/1; MG/1; MG/1
10 TABLET ORAL 2 TIMES DAILY
Qty: 60 TABLET | Refills: 0 | Status: SHIPPED | OUTPATIENT
Start: 2024-04-16 | End: 2024-05-16

## 2024-02-14 RX ORDER — DEXTROAMPHETAMINE SACCHARATE, AMPHETAMINE ASPARTATE, DEXTROAMPHETAMINE SULFATE AND AMPHETAMINE SULFATE 2.5; 2.5; 2.5; 2.5 MG/1; MG/1; MG/1; MG/1
10 TABLET ORAL 2 TIMES DAILY
Qty: 60 TABLET | Refills: 0 | Status: SHIPPED | OUTPATIENT
Start: 2024-02-14 | End: 2024-03-15

## 2024-02-14 RX ORDER — DEXTROAMPHETAMINE SACCHARATE, AMPHETAMINE ASPARTATE, DEXTROAMPHETAMINE SULFATE AND AMPHETAMINE SULFATE 2.5; 2.5; 2.5; 2.5 MG/1; MG/1; MG/1; MG/1
10 TABLET ORAL 2 TIMES DAILY
Qty: 60 TABLET | Refills: 0 | Status: SHIPPED | OUTPATIENT
Start: 2024-03-16 | End: 2024-04-15

## 2024-03-13 ENCOUNTER — MYC REFILL (OUTPATIENT)
Dept: FAMILY MEDICINE | Facility: CLINIC | Age: 24
End: 2024-03-13
Payer: COMMERCIAL

## 2024-03-13 DIAGNOSIS — F90.0 ATTENTION DEFICIT HYPERACTIVITY DISORDER (ADHD), PREDOMINANTLY INATTENTIVE TYPE: ICD-10-CM

## 2024-03-13 RX ORDER — DEXTROAMPHETAMINE SACCHARATE, AMPHETAMINE ASPARTATE, DEXTROAMPHETAMINE SULFATE AND AMPHETAMINE SULFATE 2.5; 2.5; 2.5; 2.5 MG/1; MG/1; MG/1; MG/1
10 TABLET ORAL 2 TIMES DAILY
Qty: 60 TABLET | Refills: 0 | Status: SHIPPED | OUTPATIENT
Start: 2024-05-14 | End: 2024-06-13

## 2024-03-13 RX ORDER — DEXTROAMPHETAMINE SACCHARATE, AMPHETAMINE ASPARTATE, DEXTROAMPHETAMINE SULFATE AND AMPHETAMINE SULFATE 2.5; 2.5; 2.5; 2.5 MG/1; MG/1; MG/1; MG/1
10 TABLET ORAL 2 TIMES DAILY
Qty: 60 TABLET | Refills: 0 | Status: SHIPPED | OUTPATIENT
Start: 2024-03-13 | End: 2024-04-12

## 2024-03-13 RX ORDER — DEXTROAMPHETAMINE SACCHARATE, AMPHETAMINE ASPARTATE, DEXTROAMPHETAMINE SULFATE AND AMPHETAMINE SULFATE 2.5; 2.5; 2.5; 2.5 MG/1; MG/1; MG/1; MG/1
10 TABLET ORAL 2 TIMES DAILY
Qty: 60 TABLET | Refills: 0 | Status: SHIPPED | OUTPATIENT
Start: 2024-04-13 | End: 2024-05-13

## 2024-03-13 RX ORDER — DEXTROAMPHETAMINE SACCHARATE, AMPHETAMINE ASPARTATE, DEXTROAMPHETAMINE SULFATE AND AMPHETAMINE SULFATE 2.5; 2.5; 2.5; 2.5 MG/1; MG/1; MG/1; MG/1
10 TABLET ORAL 2 TIMES DAILY
Qty: 60 TABLET | Refills: 0 | OUTPATIENT
Start: 2024-03-13

## 2024-07-15 ENCOUNTER — MYC REFILL (OUTPATIENT)
Dept: FAMILY MEDICINE | Facility: CLINIC | Age: 24
End: 2024-07-15
Payer: COMMERCIAL

## 2024-07-15 DIAGNOSIS — F90.0 ATTENTION DEFICIT HYPERACTIVITY DISORDER (ADHD), PREDOMINANTLY INATTENTIVE TYPE: ICD-10-CM

## 2024-07-16 RX ORDER — DEXTROAMPHETAMINE SACCHARATE, AMPHETAMINE ASPARTATE, DEXTROAMPHETAMINE SULFATE AND AMPHETAMINE SULFATE 2.5; 2.5; 2.5; 2.5 MG/1; MG/1; MG/1; MG/1
10 TABLET ORAL 2 TIMES DAILY
Qty: 60 TABLET | Refills: 0 | Status: SHIPPED | OUTPATIENT
Start: 2024-07-16 | End: 2024-08-14

## 2024-08-14 ENCOUNTER — MYC REFILL (OUTPATIENT)
Dept: FAMILY MEDICINE | Facility: CLINIC | Age: 24
End: 2024-08-14
Payer: COMMERCIAL

## 2024-08-14 DIAGNOSIS — F90.0 ATTENTION DEFICIT HYPERACTIVITY DISORDER (ADHD), PREDOMINANTLY INATTENTIVE TYPE: ICD-10-CM

## 2024-08-14 RX ORDER — DEXTROAMPHETAMINE SACCHARATE, AMPHETAMINE ASPARTATE, DEXTROAMPHETAMINE SULFATE AND AMPHETAMINE SULFATE 2.5; 2.5; 2.5; 2.5 MG/1; MG/1; MG/1; MG/1
10 TABLET ORAL 2 TIMES DAILY
Qty: 60 TABLET | Refills: 0 | Status: SHIPPED | OUTPATIENT
Start: 2024-08-14 | End: 2024-09-16

## 2024-09-06 ENCOUNTER — PATIENT OUTREACH (OUTPATIENT)
Dept: CARE COORDINATION | Facility: CLINIC | Age: 24
End: 2024-09-06
Payer: COMMERCIAL

## 2024-09-16 ENCOUNTER — MYC REFILL (OUTPATIENT)
Dept: FAMILY MEDICINE | Facility: CLINIC | Age: 24
End: 2024-09-16
Payer: COMMERCIAL

## 2024-09-16 DIAGNOSIS — F90.0 ATTENTION DEFICIT HYPERACTIVITY DISORDER (ADHD), PREDOMINANTLY INATTENTIVE TYPE: ICD-10-CM

## 2024-09-16 RX ORDER — DEXTROAMPHETAMINE SACCHARATE, AMPHETAMINE ASPARTATE, DEXTROAMPHETAMINE SULFATE AND AMPHETAMINE SULFATE 2.5; 2.5; 2.5; 2.5 MG/1; MG/1; MG/1; MG/1
10 TABLET ORAL 2 TIMES DAILY
Qty: 60 TABLET | Refills: 0 | Status: SHIPPED | OUTPATIENT
Start: 2024-09-16

## 2024-10-10 ENCOUNTER — LAB (OUTPATIENT)
Dept: LAB | Facility: CLINIC | Age: 24
End: 2024-10-10
Payer: COMMERCIAL

## 2024-10-10 DIAGNOSIS — Z31.41 FERTILITY TESTING: ICD-10-CM

## 2024-10-10 PROCEDURE — 89322 SEMEN ANAL STRICT CRITERIA: CPT

## 2024-10-11 LAB
ABNORMAL SPERM MORPHOLOGY: 99
ABSTINENCE DAYS: 2 DAYS (ref 2–7)
AGGLUTINATION: NO
ANALYSIS TEMP - CENTIGRADE: 21 CENTIGRADE
COLLECTION METHOD: ABNORMAL
COLLECTION SITE: ABNORMAL
CONSENT TO RELEASE TO PARTNER: YES
DAL- RECEIVED TIME: ABNORMAL
HEAD DEFECT: 99 %
IMMOTILE: 22 %
LIQUEFIED: YES
MIDPIECE DEFECT: 36 %
NON-PROGRESSIVE MOTILITY: 5 %
NORMAL SPERM MORPHOLOGY: 1 % NORMAL FORMS
PROGRESSIVE MOTILITY: 73 %
ROUND CELLS: 0.7 MILLION/ML
SPECIMEN PH: 7.2 PH
SPECIMEN VOLUME: 3.1 ML
SPERM CONCENTRATION: 94.8 MILLION/ML
TAIL DEFECT: 5 %
TIME OF ANALYSIS: ABNORMAL
TOTAL PROGRESSIVE MOTILE NUMBER: 215 MILLION
TOTAL SPERM NUMBER: 294 MILLION
VISCOUS: NO
VITALITY: ABNORMAL

## 2024-10-15 ENCOUNTER — MYC MEDICAL ADVICE (OUTPATIENT)
Dept: FAMILY MEDICINE | Facility: CLINIC | Age: 24
End: 2024-10-15
Payer: COMMERCIAL

## 2024-10-15 ENCOUNTER — OFFICE VISIT (OUTPATIENT)
Dept: URGENT CARE | Facility: URGENT CARE | Age: 24
End: 2024-10-15
Payer: COMMERCIAL

## 2024-10-15 VITALS
TEMPERATURE: 98.6 F | BODY MASS INDEX: 24.75 KG/M2 | RESPIRATION RATE: 12 BRPM | OXYGEN SATURATION: 99 % | SYSTOLIC BLOOD PRESSURE: 110 MMHG | HEART RATE: 97 BPM | WEIGHT: 167.6 LBS | DIASTOLIC BLOOD PRESSURE: 60 MMHG

## 2024-10-15 DIAGNOSIS — L03.116 CELLULITIS OF LEFT LOWER EXTREMITY: ICD-10-CM

## 2024-10-15 DIAGNOSIS — L02.91 ABSCESS: Primary | ICD-10-CM

## 2024-10-15 PROCEDURE — 10060 I&D ABSCESS SIMPLE/SINGLE: CPT | Performed by: PHYSICIAN ASSISTANT

## 2024-10-15 PROCEDURE — 87070 CULTURE OTHR SPECIMN AEROBIC: CPT | Performed by: PHYSICIAN ASSISTANT

## 2024-10-15 RX ORDER — CEPHALEXIN 500 MG/1
500 CAPSULE ORAL 4 TIMES DAILY
Qty: 40 CAPSULE | Refills: 0 | Status: SHIPPED | OUTPATIENT
Start: 2024-10-15 | End: 2024-10-25

## 2024-10-15 NOTE — PATIENT INSTRUCTIONS
You were seen today for an abscess that required incision and drainage. We will also treat with a course of antibiotics. Keep wound site clean and dry and monitor for signs of developing infection listed below.     Symptom management:  - Keep dressing on for next 24 hours and do not allow to get wet  - May then clean area with gentle soap and water with warm soaking several times a day  - Reapply a new bandage with topical antibiotics daily  - Take antibiotics for full course, even if symptoms completely improve     Reasons to return for immediate re-evaluation:  - Fever of 100.4 or higher  - Spreading redness, swelling, or tenderness  - Abscess re-develops    If wound packing was placed in the abscess, return for follow-up in 48 hours to change or remove packing. Otherwise, follow-up with your primary care provider if symptoms have not completely resolved in 1 week.

## 2024-10-15 NOTE — PROGRESS NOTES
Assessment & Plan:      Problem List Items Addressed This Visit    None  Visit Diagnoses       Abscess    -  Primary    Relevant Medications    cephALEXin (KEFLEX) 500 MG capsule    Other Relevant Orders    Abscess Aerobic Bacterial Culture Routine Without Gram Stain    Cellulitis of left lower extremity              Medical Decision Making  Patient presents for chronic cyst of the left thigh that does not become more swollen and tender over the last 3 to 4 days.  Abscess was amenable to incision and drainage.  See procedure notes below.  Patient started on oral antibiotics.  Discussed treatment and symptomatic care.  Allergies and medication interactions reviewed.  Discussed signs of worsening symptoms and when to follow-up with PCP if no symptom improvement.    Procedure  Procedure involved cleansing the area with alcohol wipes before providing local anesthetic of 2 mL of 1% lidocaine with epinephrine. Applied betadine solution. Made a 0.5 cm linear incision with a size 11 blade. Drained all materials. Assessment of wound was negative for foreign bodies. Collected a wound culture for culture sensitivities.  Covered site with sterile dressing.     Subjective:      Taiwo Mart is a 23 year old male here for evaluation of swelling cyst of the left posterior thigh.  Patient has had cyst present for several weeks.  He then tried to drain the cyst and the redness and swelling has worsened since then.  Swelling has been worsening for 3 to 4 days.     The following portions of the patient's history were reviewed and updated as appropriate: allergies, current medications, and problem list.     Review of Systems  Pertinent items are noted in HPI.    Allergies  No Known Allergies    Family History   Problem Relation Age of Onset    Allergies Mother     Anxiety Disorder Mother     Family History Negative Father     No Known Problems Sister        Social History     Tobacco Use    Smoking status: Former     Current  packs/day: 0.00     Types: Cigarettes, Other     Quit date: 2021     Years since quitting: 3.7    Smokeless tobacco: Never    Tobacco comments:     Nicotine Mints   Substance Use Topics    Alcohol use: Not Currently     Comment: social        Objective:      /60   Pulse 97   Temp 98.6  F (37  C) (Oral)   Resp 12   Wt 76 kg (167 lb 9.6 oz)   SpO2 99%   BMI 24.75 kg/m    General appearance - alert, well appearing, and in no distress and non-toxic  Skin - left posterior thigh: Central raised fluctuant mass about 3 cm in diameter with then extending erythema and increased warmth to touch greater than 10 cm in diameter     Lab & Imaging Results    No results found for any visits on 10/15/24.    I personally reviewed these results and discussed findings with the patient.    The use of Dragon/[employer] dictation services was used to construct the content of this note; any grammatical errors are non-intentional. Please contact the author directly if you are in need of any clarification.

## 2024-10-17 ENCOUNTER — MYC REFILL (OUTPATIENT)
Dept: FAMILY MEDICINE | Facility: CLINIC | Age: 24
End: 2024-10-17
Payer: COMMERCIAL

## 2024-10-17 DIAGNOSIS — F90.0 ATTENTION DEFICIT HYPERACTIVITY DISORDER (ADHD), PREDOMINANTLY INATTENTIVE TYPE: ICD-10-CM

## 2024-10-17 RX ORDER — DEXTROAMPHETAMINE SACCHARATE, AMPHETAMINE ASPARTATE, DEXTROAMPHETAMINE SULFATE AND AMPHETAMINE SULFATE 2.5; 2.5; 2.5; 2.5 MG/1; MG/1; MG/1; MG/1
10 TABLET ORAL 2 TIMES DAILY
Qty: 60 TABLET | Refills: 0 | Status: CANCELLED | OUTPATIENT
Start: 2024-10-17

## 2024-10-17 RX ORDER — DEXTROAMPHETAMINE SACCHARATE, AMPHETAMINE ASPARTATE, DEXTROAMPHETAMINE SULFATE AND AMPHETAMINE SULFATE 2.5; 2.5; 2.5; 2.5 MG/1; MG/1; MG/1; MG/1
10 TABLET ORAL 2 TIMES DAILY
Qty: 60 TABLET | Refills: 0 | Status: SHIPPED | OUTPATIENT
Start: 2024-11-16 | End: 2024-12-16

## 2024-10-17 RX ORDER — DEXTROAMPHETAMINE SACCHARATE, AMPHETAMINE ASPARTATE, DEXTROAMPHETAMINE SULFATE AND AMPHETAMINE SULFATE 2.5; 2.5; 2.5; 2.5 MG/1; MG/1; MG/1; MG/1
10 TABLET ORAL 2 TIMES DAILY
Qty: 60 TABLET | Refills: 0 | Status: SHIPPED | OUTPATIENT
Start: 2024-12-16 | End: 2025-01-15

## 2024-10-17 RX ORDER — DEXTROAMPHETAMINE SACCHARATE, AMPHETAMINE ASPARTATE, DEXTROAMPHETAMINE SULFATE AND AMPHETAMINE SULFATE 2.5; 2.5; 2.5; 2.5 MG/1; MG/1; MG/1; MG/1
10 TABLET ORAL 2 TIMES DAILY
Qty: 60 TABLET | Refills: 0 | Status: SHIPPED | OUTPATIENT
Start: 2024-10-17 | End: 2024-11-16

## 2024-10-18 LAB — BACTERIA ABSC ANAEROBE+AEROBE CULT: NO GROWTH

## 2024-10-31 ENCOUNTER — PATIENT OUTREACH (OUTPATIENT)
Dept: CARE COORDINATION | Facility: CLINIC | Age: 24
End: 2024-10-31
Payer: COMMERCIAL

## 2024-12-16 ENCOUNTER — MYC REFILL (OUTPATIENT)
Dept: FAMILY MEDICINE | Facility: CLINIC | Age: 24
End: 2024-12-16
Payer: COMMERCIAL

## 2024-12-16 DIAGNOSIS — F90.0 ATTENTION DEFICIT HYPERACTIVITY DISORDER (ADHD), PREDOMINANTLY INATTENTIVE TYPE: ICD-10-CM

## 2024-12-17 RX ORDER — DEXTROAMPHETAMINE SACCHARATE, AMPHETAMINE ASPARTATE, DEXTROAMPHETAMINE SULFATE AND AMPHETAMINE SULFATE 2.5; 2.5; 2.5; 2.5 MG/1; MG/1; MG/1; MG/1
10 TABLET ORAL 2 TIMES DAILY
Qty: 60 TABLET | Refills: 0 | OUTPATIENT
Start: 2024-12-17

## 2025-01-05 ENCOUNTER — HEALTH MAINTENANCE LETTER (OUTPATIENT)
Age: 25
End: 2025-01-05

## 2025-01-16 ENCOUNTER — MYC REFILL (OUTPATIENT)
Dept: FAMILY MEDICINE | Facility: CLINIC | Age: 25
End: 2025-01-16
Payer: COMMERCIAL

## 2025-01-16 DIAGNOSIS — F90.0 ATTENTION DEFICIT HYPERACTIVITY DISORDER (ADHD), PREDOMINANTLY INATTENTIVE TYPE: ICD-10-CM

## 2025-01-16 NOTE — LETTER
January 22, 2025      Taiwo Mart  [address]  [facility] [address]        Taiwo Mart      We recently received a refill request for your    amphetamine-dextroamphetamine (ADDERALL) 10 MG tablet   .     Our records show you are due for a follow up visit with your provider.     Please call the clinic at [phone] to schedule as the providers are booking out.          Sincerely,        Ramona Ann Aaseby-Aguilera, PA-C

## 2025-01-17 RX ORDER — DEXTROAMPHETAMINE SACCHARATE, AMPHETAMINE ASPARTATE, DEXTROAMPHETAMINE SULFATE AND AMPHETAMINE SULFATE 2.5; 2.5; 2.5; 2.5 MG/1; MG/1; MG/1; MG/1
10 TABLET ORAL 2 TIMES DAILY
Qty: 60 TABLET | Refills: 0 | OUTPATIENT
Start: 2025-01-17

## 2025-01-17 NOTE — TELEPHONE ENCOUNTER
Overdue for needed care. Please call to schedule. Once appt is scheduled, route back to me.  Okay for video double book or get on Lanette's schedule; has not been seen in one year. Would give bridge RX until appointment once scheduled

## 2025-01-21 NOTE — TELEPHONE ENCOUNTER
Left message for patient to return call, needs to schedule follow up prior to an approved request     Carol Adkins/

## 2025-06-02 ENCOUNTER — DOCUMENTATION ONLY (OUTPATIENT)
Dept: FAMILY MEDICINE | Facility: CLINIC | Age: 25
End: 2025-06-02

## 2025-06-02 NOTE — CONFIDENTIAL NOTE
"  Unsigned Note  Patient has lab only appt TODAY for \"LAB: HEP C\", no orders in chart.  Please place FUTURE orders in Epic if appropriate.     Thanks,   [facility] lab          LM this has not been ordered by PCP and has not been seen  10/2023  order will not be placed.     Saira Jones, RN     "

## 2025-06-02 NOTE — PROGRESS NOTES
"Patient has lab only appt TODAY for \"LAB: HEP C\", no orders in chart.  Please place FUTURE orders in Epic if appropriate.    Thanks,   [facility] lab    "

## 2025-06-12 ENCOUNTER — OFFICE VISIT (OUTPATIENT)
Dept: FAMILY MEDICINE | Facility: CLINIC | Age: 25
End: 2025-06-12
Payer: COMMERCIAL

## 2025-06-12 ENCOUNTER — RESULTS FOLLOW-UP (OUTPATIENT)
Dept: FAMILY MEDICINE | Facility: CLINIC | Age: 25
End: 2025-06-12

## 2025-06-12 VITALS
HEART RATE: 65 BPM | TEMPERATURE: 98.2 F | DIASTOLIC BLOOD PRESSURE: 72 MMHG | WEIGHT: 180.9 LBS | SYSTOLIC BLOOD PRESSURE: 111 MMHG | HEIGHT: 69 IN | RESPIRATION RATE: 16 BRPM | BODY MASS INDEX: 26.79 KG/M2 | OXYGEN SATURATION: 98 %

## 2025-06-12 DIAGNOSIS — F90.0 ATTENTION DEFICIT HYPERACTIVITY DISORDER (ADHD), PREDOMINANTLY INATTENTIVE TYPE: ICD-10-CM

## 2025-06-12 DIAGNOSIS — Z11.59 NEED FOR HEPATITIS C SCREENING TEST: ICD-10-CM

## 2025-06-12 DIAGNOSIS — Z00.00 ROUTINE GENERAL MEDICAL EXAMINATION AT A HEALTH CARE FACILITY: Primary | ICD-10-CM

## 2025-06-12 DIAGNOSIS — Z13.1 SCREENING FOR DIABETES MELLITUS: ICD-10-CM

## 2025-06-12 DIAGNOSIS — Z13.6 SCREENING FOR CARDIOVASCULAR CONDITION: ICD-10-CM

## 2025-06-12 LAB
CHOLEST SERPL-MCNC: 225 MG/DL
EST. AVERAGE GLUCOSE BLD GHB EST-MCNC: 105 MG/DL
FASTING STATUS PATIENT QL REPORTED: YES
HBA1C MFR BLD: 5.3 % (ref 0–5.6)
HCV AB SERPL QL IA: NONREACTIVE
HCV AB SERPL QL IA: NONREACTIVE
HDLC SERPL-MCNC: 44 MG/DL
LDLC SERPL CALC-MCNC: 161 MG/DL
NONHDLC SERPL-MCNC: 181 MG/DL
TRIGL SERPL-MCNC: 101 MG/DL

## 2025-06-12 RX ORDER — ATOMOXETINE 25 MG/1
CAPSULE ORAL
Qty: 70 CAPSULE | Refills: 0 | Status: SHIPPED | OUTPATIENT
Start: 2025-06-12 | End: 2025-06-12

## 2025-06-12 RX ORDER — ATOMOXETINE 25 MG/1
CAPSULE ORAL
Qty: 70 CAPSULE | Refills: 3 | Status: SHIPPED | OUTPATIENT
Start: 2025-06-12 | End: 2025-07-24

## 2025-06-12 RX ORDER — MULTIVITAMIN
1 TABLET ORAL DAILY
COMMUNITY

## 2025-06-12 SDOH — HEALTH STABILITY: PHYSICAL HEALTH: ON AVERAGE, HOW MANY DAYS PER WEEK DO YOU ENGAGE IN MODERATE TO STRENUOUS EXERCISE (LIKE A BRISK WALK)?: 4 DAYS

## 2025-06-12 ASSESSMENT — SOCIAL DETERMINANTS OF HEALTH (SDOH): HOW OFTEN DO YOU GET TOGETHER WITH FRIENDS OR RELATIVES?: ONCE A WEEK

## 2025-06-12 ASSESSMENT — PATIENT HEALTH QUESTIONNAIRE - PHQ9
SUM OF ALL RESPONSES TO PHQ QUESTIONS 1-9: 15
SUM OF ALL RESPONSES TO PHQ QUESTIONS 1-9: 15
10. IF YOU CHECKED OFF ANY PROBLEMS, HOW DIFFICULT HAVE THESE PROBLEMS MADE IT FOR YOU TO DO YOUR WORK, TAKE CARE OF THINGS AT HOME, OR GET ALONG WITH OTHER PEOPLE: VERY DIFFICULT

## 2025-06-12 NOTE — PATIENT INSTRUCTIONS
Start Strattera 25 mg for 2 weeks. Then increase to 50 mg for 4 weeks and we ill follow up in one month virtually to see how you are tolerating the medication and assess the benefit.       Patient Education   Preventive Care Advice   This is general advice given by our system to help you stay healthy. However, your care team may have specific advice just for you. Please talk to your care team about your preventive care needs.  Nutrition  Eat 5 or more servings of fruits and vegetables each day.  Try wheat bread, brown rice and whole grain pasta (instead of white bread, rice, and pasta).  Get enough calcium and vitamin D. Check the label on foods and aim for 100% of the RDA (recommended daily allowance).  Lifestyle  Exercise at least 150 minutes each week  (30 minutes a day, 5 days a week).  Do muscle strengthening activities 2 days a week. These help control your weight and prevent disease.  No smoking.  Wear sunscreen to prevent skin cancer.  Have a dental exam and cleaning every 6 months.  Yearly exams  See your health care team every year to talk about:  Any changes in your health.  Any medicines your care team has prescribed.  Preventive care, family planning, and ways to prevent chronic diseases.  Shots (vaccines)   HPV shots (up to age 26), if you've never had them before.  Hepatitis B shots (up to age 59), if you've never had them before.  COVID-19 shot: Get this shot when it's due.  Flu shot: Get a flu shot every year.  Tetanus shot: Get a tetanus shot every 10 years.  Pneumococcal, hepatitis A, and RSV shots: Ask your care team if you need these based on your risk.  Shingles shot (for age 50 and up)  General health tests  Diabetes screening:  Starting at age 35, Get screened for diabetes at least every 3 years.  If you are younger than age 35, ask your care team if you should be screened for diabetes.  Cholesterol test: At age 39, start having a cholesterol test every 5 years, or more often if  advised.  Bone density scan (DEXA): At age 50, ask your care team if you should have this scan for osteoporosis (brittle bones).  Hepatitis C: Get tested at least once in your life.  STIs (sexually transmitted infections)  Before age 24: Ask your care team if you should be screened for STIs.  After age 24: Get screened for STIs if you're at risk. You are at risk for STIs (including HIV) if:  You are sexually active with more than one person.  You don't use condoms every time.  You or a partner was diagnosed with a sexually transmitted infection.  If you are at risk for HIV, ask about PrEP medicine to prevent HIV.  Get tested for HIV at least once in your life, whether you are at risk for HIV or not.  Cancer screening tests  Cervical cancer screening: If you have a cervix, begin getting regular cervical cancer screening tests starting at age 21.  Breast cancer scan (mammogram): If you've ever had breasts, begin having regular mammograms starting at age 40. This is a scan to check for breast cancer.  Colon cancer screening: It is important to start screening for colon cancer at age 45.  Have a colonoscopy test every 10 years (or more often if you're at risk) Or, ask your provider about stool tests like a FIT test every year or Cologuard test every 3 years.  To learn more about your testing options, visit:   .  For help making a decision, visit:   https://bit.ly/mm75328.  Prostate cancer screening test: If you have a prostate, ask your care team if a prostate cancer screening test (PSA) at age 55 is right for you.  Lung cancer screening: If you are a current or former smoker ages 50 to 80, ask your care team if ongoing lung cancer screenings are right for you.  For informational purposes only. Not to replace the advice of your health care provider. Copyright   2023 [employer]. All rights reserved. Clinically reviewed by the [facility]. [employer] 119857 - REV 01/24.  Learning  About Stress  What is stress?     Stress is your body's response to a hard situation. Your body can have a physical, emotional, or mental response. Stress is a fact of life for most people, and it affects everyone differently. What causes stress for you may not be stressful for someone else.  A lot of things can cause stress. You may feel stress when you go on a job interview, take a test, or run a race. This kind of short-term stress is normal and even useful. It can help you if you need to work hard or react quickly. For example, stress can help you finish an important job on time.  Long-term stress is caused by ongoing stressful situations or events. Examples of long-term stress include long-term health problems, ongoing problems at work, or conflicts in your family. Long-term stress can harm your health.  How does stress affect your health?  When you are stressed, your body responds as though you are in danger. It makes hormones that speed up your heart, make you breathe faster, and give you a burst of energy. This is called the fight-or-flight stress response. If the stress is over quickly, your body goes back to normal and no harm is done.  But if stress happens too often or lasts too long, it can have bad effects. Long-term stress can make you more likely to get sick, and it can make symptoms of some diseases worse. If you tense up when you are stressed, you may develop neck, shoulder, or low back pain. Stress is linked to high blood pressure and heart disease.  Stress also harms your emotional health. It can make you nick, tense, or depressed. Your relationships may suffer, and you may not do well at work or school.  What can you do to manage stress?  You can try these things to help manage stress:   Do something active. Exercise or activity can help reduce stress. Walking is a great way to get started. Even everyday activities such as housecleaning or yard work can help.  Try yoga or luba chi. These  techniques combine exercise and meditation. You may need some training at first to learn them.  Do something you enjoy. For example, listen to music or go to a movie. Practice your hobby or do volunteer work.  Meditate. This can help you relax, because you are not worrying about what happened before or what may happen in the future.  Do guided imagery. Imagine yourself in any setting that helps you feel calm. You can use online videos, books, or a teacher to guide you.  Do breathing exercises. For example:  From a standing position, bend forward from the waist with your knees slightly bent. Let your arms dangle close to the floor.  Breathe in slowly and deeply as you return to a standing position. Roll up slowly and lift your head last.  Hold your breath for just a few seconds in the standing position.  Breathe out slowly and bend forward from the waist.  Let your feelings out. Talk, laugh, cry, and express anger when you need to. Talking with supportive friends or family, a counselor, or a emily leader about your feelings is a healthy way to relieve stress. Avoid discussing your feelings with people who make you feel worse.  Write. It may help to write about things that are bothering you. This helps you find out how much stress you feel and what is causing it. When you know this, you can find better ways to cope.  What can you do to prevent stress?  You might try some of these things to help prevent stress:  Manage your time. This helps you find time to do the things you want and need to do.  Get enough sleep. Your body recovers from the stresses of the day while you are sleeping.  Get support. Your family, friends, and community can make a difference in how you experience stress.  Limit your news feed. Avoid or limit time on social media or news that may make you feel stressed.  Do something active. Exercise or activity can help reduce stress. Walking is a great way to get started.  Where can you learn more?  Go  "to https://www.[employer]net/patiented  Enter N032 in the search box to learn more about \"Learning About Stress.\"  Current as of: October 24, 2024  Content Version: 14.5 2024-2025 [employer].   Care instructions adapted under license by your healthcare professional. If you have questions about a medical condition or this instruction, always ask your healthcare professional. [employer] disclaims any warranty or liability for your use of this information.    Learning About Depression Screening  What is depression screening?  Depression screening is a way to see if you have depression symptoms. It may be done by a doctor or counselor. It's often part of a routine checkup. That's because your mental health is just as important as your physical health.  Depression is a mental health condition that affects how you feel, think, and act. You may:  Have less energy.  Lose interest in your daily activities.  Feel sad and grouchy for a long time.  Depression is very common. It affects people of all ages.  Many things can lead to depression. Some people become depressed after they have a stroke or find out they have a major illness like cancer or heart disease. The death of a loved one or a breakup may lead to depression. It can run in families. Most experts believe that a combination of inherited genes and stressful life events can cause it.  What happens during screening?  You may be asked to fill out a form about your depression symptoms. You and the doctor will discuss your answers. The doctor may ask you more questions to learn more about how you think, act, and feel.  What happens after screening?  If you have symptoms of depression, your doctor will talk to you about your options.  Doctors usually treat depression with medicines or counseling. Often, combining the two works best. Many people don't get help because they think that they'll get over the depression on their own. But people with " "depression may not get better unless they get treatment.  The cause of depression is not well understood. There may be many factors involved. But if you have depression, it's not your fault.  A serious symptom of depression is thinking about death or suicide. If you or someone you care about talks about this or about feeling hopeless, get help right away.  It's important to know that depression can be treated. Medicine, counseling, and self-care may help.  Where can you learn more?  Go to https://www.[employer].net/patiented  Enter T185 in the search box to learn more about \"Learning About Depression Screening.\"  Current as of: July 31, 2024  Content Version: 14.5 2024-2025 [employer].   Care instructions adapted under license by your healthcare professional. If you have questions about a medical condition or this instruction, always ask your healthcare professional. "[employer] disclaims any warranty or liability for your use of this information.       "

## 2025-06-12 NOTE — PROGRESS NOTES
"Preventive Care Visit  [facility]  JAIRO Charlton CNP, Family Medicine  Jun 12, 2025      Assessment & Plan     (Z00.00) Routine general medical examination at a health care facility  (primary encounter diagnosis)  Comment: Reviewed age and gender appropriate screenings and lifestyle modifications with patient.   Declined HPV vaccine today.   Screen for DM2, HLD today given weight increase in last two years.   ADHD, Hepatitis concerns addressed today.  Advised regular dental visit.   No MH or skin concerns.     (Z11.59) Need for hepatitis C screening test  Comment: Reports previous positive Hep C antibody test via fertility clinic. 2023 hx of elevated LFT's, will repeat screening today.   Plan: Hepatitis C Screen Reflex to HCV RNA Quant and         Genotype, Hepatitis C antibody    (F90.0) Attention deficit hyperactivity disorder (ADHD), predominantly inattentive type  Comment: Wants non-stimulate treatment, low dose.   Plan: atomoxetine (STRATTERA) 25 MG capsule x 2 weeks then increase to 50 mg daily. Follow up in 1 month virtually.     (Z13.6) Screening for cardiovascular condition  Plan: Lipid panel reflex to direct LDL Fasting    (Z13.1) Screening for diabetes mellitus  Plan: Hemoglobin A1c      Patient has been advised of split billing requirements and indicates understanding: Yes        BMI  Estimated body mass index is 27.11 kg/m  as calculated from the following:    Height as of this encounter: 1.74 m (5' 8.5\").    Weight as of this encounter: 82.1 kg (180 lb 14.4 oz).   Weight management plan: Discussed healthy diet and exercise guidelines    Counseling  Appropriate preventive services were addressed with this patient via screening, questionnaire, or discussion as appropriate for fall prevention, nutrition, physical activity, Tobacco-use cessation, social engagement, weight loss and cognition.  Checklist reviewing preventive services available has been given to the " patient.  Reviewed patient's diet, addressing concerns and/or questions.   The patient was instructed to see the dentist every 6 months.   He is at risk for psychosocial distress and has been provided with information to reduce risk.   The patient's PHQ-9 score is consistent with moderate depression. He was provided with information regarding depression.     Patient should follow up in 1 year for annual wellness or sooner for new or worsening symptoms. All questions answered to patient's satisfaction. Warning signs of when to seek emergency care were discussed.       Marjorie Maravilla is a 24 year old, presenting for the following:  Physical (Pt is fasting - concerns about Hep C and Adderall)        6/12/2025     6:57 AM   Additional Questions   Roomed by Lorena Bellamy MA          HPI  History of Present Illness-  T[place] S [place], 24-year-old male  - Positive Hepatitis C antibody test, requiring further blood work to confirm diagnosis  - No symptoms such as abdominal pain or jaundice reported  - Elevated liver enzymes noted 2 years ago, which normalized after a month  - Stopped taking Adderall 6 months ago due to negative effects on emotions and focus  - Experiencing rebound distractive symptoms with overthinking and inability to focus on tasks at hand.   - No low mood, anxiety per pt.   - Underwent lifestyle changes 6 months ago, including cessation of alcohol consumption, leading to improved semen analysis results; wife currently expecting.   - No sti concerns.   - No skin concerns.           Advance Care Planning    Discussed advance care planning with patient; however, patient declined at this time.        6/12/2025   General Health   How would you rate your overall physical health? Good   Feel stress (tense, anxious, or unable to sleep) Rather much   (!) STRESS CONCERN      6/12/2025   Nutrition   Three or more servings of calcium each day? (!) NO   Diet: Regular (no restrictions)   How many servings of fruit and  vegetables per day? (!) 0-1   How many sweetened beverages each day? (!) 2         2025   Exercise   Days per week of moderate/strenous exercise 4 days         2025   Social Factors   Frequency of gathering with friends or relatives Once a week   Worry food won't last until get money to buy more No   Food not last or not have enough money for food? No   Do you have housing? (Housing is defined as stable permanent housing and does not include staying outside in a car, in a tent, in an abandoned building, in an overnight shelter, or couch-surfing.) Yes   Are you worried about losing your housing? No   Lack of transportation? No   Unable to get utilities (heat,electricity)? No         2025   Dental   Dentist two times every year? (!) NO       Today's PHQ-9 Score:       2025     6:51 AM   PHQ-9 SCORE   PHQ-9 Total Score MyChart 15 (Moderately severe depression)   PHQ-9 Total Score 15        Patient-reported         2025   Substance Use   Alcohol more than 3/day or more than 7/wk Not Applicable   Do you use any other substances recreationally? No     Social History     Tobacco Use    Smoking status: Former     Current packs/day: 0.00     Types: Cigarettes, Other     Quit date:      Years since quittin.4    Smokeless tobacco: Never    Tobacco comments:     Nicotine Mints   Vaping Use    Vaping status: Former    Substances: Nicotine   Substance Use Topics    Alcohol use: Not Currently     Comment: social    Drug use: No           2025   STI Screening   New sexual partner(s) since last STI/HIV test? No         2025   Contraception/Family Planning   Questions about contraception or family planning No        Reviewed and updated as needed this visit by Provider                    History reviewed. No pertinent past medical history.      Review of Systems  CONSTITUTIONAL: NEGATIVE for fever, chills, change in weight  INTEGUMENTARY/SKIN: NEGATIVE for worrisome rashes, moles or  "lesions  EYES: NEGATIVE for vision changes or irritation  ENT/MOUTH: NEGATIVE for ear, mouth and throat problems  RESP: NEGATIVE for significant cough or SOB  BREAST: NEGATIVE for masses, tenderness or discharge  CV: NEGATIVE for chest pain, palpitations or peripheral edema  GI: NEGATIVE for nausea, abdominal pain, heartburn, or change in bowel habits  : NEGATIVE for frequency, dysuria, or hematuria  MUSCULOSKELETAL: NEGATIVE for significant arthralgias or myalgia  NEURO: NEGATIVE for weakness, dizziness or paresthesias  ENDOCRINE: NEGATIVE for temperature intolerance, skin/hair changes  HEME: NEGATIVE for bleeding problems  PSYCHIATRIC: NEGATIVE for changes in mood or affect     Objective    Exam  /72 (BP Location: Right arm, Patient Position: Sitting, Cuff Size: Adult Large)   Pulse 65   Temp 98.2  F (36.8  C) (Oral)   Resp 16   Ht 1.74 m (5' 8.5\")   Wt 82.1 kg (180 lb 14.4 oz)   SpO2 98%   BMI 27.11 kg/m     Estimated body mass index is 27.11 kg/m  as calculated from the following:    Height as of this encounter: 1.74 m (5' 8.5\").    Weight as of this encounter: 82.1 kg (180 lb 14.4 oz).    Physical Exam  GENERAL: alert and no distress  EYES: Eyes grossly normal to inspection, PERRL and conjunctivae and sclerae normal  HENT: ear canals and TM's normal, nose and mouth without ulcers or lesions  NECK: no adenopathy, no asymmetry, masses, or scars  RESP: lungs clear to auscultation - no rales, rhonchi or wheezes  CV: regular rate and rhythm, normal S1 S2, no S3 or S4, no murmur, click or rub, no peripheral edema  ABDOMEN: soft, nontender, no hepatosplenomegaly, no masses and bowel sounds normal  MS: no gross musculoskeletal defects noted, no edema  SKIN: no suspicious lesions or rashes  NEURO: Normal strength and tone, mentation intact and speech normal  PSYCH: mentation appears normal, affect normal/bright  Declined  exam.     Patient Instructions   Start Strattera 25 mg for 2 weeks. Then increase " to 50 mg for 4 weeks and we ill follow up in one month virtually to see how you are tolerating the medication and assess the benefit.       Patient Education  Preventive Care Advice   This is general advice given by our system to help you stay healthy. However, your care team may have specific advice just for you. Please talk to your care team about your preventive care needs.  Nutrition  Eat 5 or more servings of fruits and vegetables each day.  Try wheat bread, brown rice and whole grain pasta (instead of white bread, rice, and pasta).  Get enough calcium and vitamin D. Check the label on foods and aim for 100% of the RDA (recommended daily allowance).  Lifestyle  Exercise at least 150 minutes each week  (30 minutes a day, 5 days a week).  Do muscle strengthening activities 2 days a week. These help control your weight and prevent disease.  No smoking.  Wear sunscreen to prevent skin cancer.  Have a dental exam and cleaning every 6 months.  Yearly exams  See your health care team every year to talk about:  Any changes in your health.  Any medicines your care team has prescribed.  Preventive care, family planning, and ways to prevent chronic diseases.  Shots (vaccines)   HPV shots (up to age 26), if you've never had them before.  Hepatitis B shots (up to age 59), if you've never had them before.  COVID-19 shot: Get this shot when it's due.  Flu shot: Get a flu shot every year.  Tetanus shot: Get a tetanus shot every 10 years.  Pneumococcal, hepatitis A, and RSV shots: Ask your care team if you need these based on your risk.  Shingles shot (for age 50 and up)  General health tests  Diabetes screening:  Starting at age 35, Get screened for diabetes at least every 3 years.  If you are younger than age 35, ask your care team if you should be screened for diabetes.  Cholesterol test: At age 39, start having a cholesterol test every 5 years, or more often if advised.  Bone density scan (DEXA): At age 50, ask your care  team if you should have this scan for osteoporosis (brittle bones).  Hepatitis C: Get tested at least once in your life.  STIs (sexually transmitted infections)  Before age 24: Ask your care team if you should be screened for STIs.  After age 24: Get screened for STIs if you're at risk. You are at risk for STIs (including HIV) if:  You are sexually active with more than one person.  You don't use condoms every time.  You or a partner was diagnosed with a sexually transmitted infection.  If you are at risk for HIV, ask about PrEP medicine to prevent HIV.  Get tested for HIV at least once in your life, whether you are at risk for HIV or not.  Cancer screening tests  Cervical cancer screening: If you have a cervix, begin getting regular cervical cancer screening tests starting at age 21.  Breast cancer scan (mammogram): If you've ever had breasts, begin having regular mammograms starting at age 40. This is a scan to check for breast cancer.  Colon cancer screening: It is important to start screening for colon cancer at age 45.  Have a colonoscopy test every 10 years (or more often if you're at risk) Or, ask your provider about stool tests like a FIT test every year or Cologuard test every 3 years.  To learn more about your testing options, visit:   .  For help making a decision, visit:   https://bit.ly/bs13222.  Prostate cancer screening test: If you have a prostate, ask your care team if a prostate cancer screening test (PSA) at age 55 is right for you.  Lung cancer screening: If you are a current or former smoker ages 50 to 80, ask your care team if ongoing lung cancer screenings are right for you.  For informational purposes only. Not to replace the advice of your health care provider. Copyright   2023 [employer]. All rights reserved. Clinically reviewed by the [facility] Transitions Program. [employer] 075256 - REV 01/24.  Learning About Stress  What is stress?     Stress is your body's  response to a hard situation. Your body can have a physical, emotional, or mental response. Stress is a fact of life for most people, and it affects everyone differently. What causes stress for you may not be stressful for someone else.  A lot of things can cause stress. You may feel stress when you go on a job interview, take a test, or run a race. This kind of short-term stress is normal and even useful. It can help you if you need to work hard or react quickly. For example, stress can help you finish an important job on time.  Long-term stress is caused by ongoing stressful situations or events. Examples of long-term stress include long-term health problems, ongoing problems at work, or conflicts in your family. Long-term stress can harm your health.  How does stress affect your health?  When you are stressed, your body responds as though you are in danger. It makes hormones that speed up your heart, make you breathe faster, and give you a burst of energy. This is called the fight-or-flight stress response. If the stress is over quickly, your body goes back to normal and no harm is done.  But if stress happens too often or lasts too long, it can have bad effects. Long-term stress can make you more likely to get sick, and it can make symptoms of some diseases worse. If you tense up when you are stressed, you may develop neck, shoulder, or low back pain. Stress is linked to high blood pressure and heart disease.  Stress also harms your emotional health. It can make you nick, tense, or depressed. Your relationships may suffer, and you may not do well at work or school.  What can you do to manage stress?  You can try these things to help manage stress:   Do something active. Exercise or activity can help reduce stress. Walking is a great way to get started. Even everyday activities such as housecleaning or yard work can help.  Try yoga or luba chi. These techniques combine exercise and meditation. You may need some  training at first to learn them.  Do something you enjoy. For example, listen to music or go to a movie. Practice your hobby or do volunteer work.  Meditate. This can help you relax, because you are not worrying about what happened before or what may happen in the future.  Do guided imagery. Imagine yourself in any setting that helps you feel calm. You can use online videos, books, or a teacher to guide you.  Do breathing exercises. For example:  From a standing position, bend forward from the waist with your knees slightly bent. Let your arms dangle close to the floor.  Breathe in slowly and deeply as you return to a standing position. Roll up slowly and lift your head last.  Hold your breath for just a few seconds in the standing position.  Breathe out slowly and bend forward from the waist.  Let your feelings out. Talk, laugh, cry, and express anger when you need to. Talking with supportive friends or family, a counselor, or a emily leader about your feelings is a healthy way to relieve stress. Avoid discussing your feelings with people who make you feel worse.  Write. It may help to write about things that are bothering you. This helps you find out how much stress you feel and what is causing it. When you know this, you can find better ways to cope.  What can you do to prevent stress?  You might try some of these things to help prevent stress:  Manage your time. This helps you find time to do the things you want and need to do.  Get enough sleep. Your body recovers from the stresses of the day while you are sleeping.  Get support. Your family, friends, and community can make a difference in how you experience stress.  Limit your news feed. Avoid or limit time on social media or news that may make you feel stressed.  Do something active. Exercise or activity can help reduce stress. Walking is a great way to get started.  Where can you learn more?  Go to https://www.healthwise.net/patiented  Enter N032 in the  "search box to learn more about \"Learning About Stress.\"  Current as of: October 24, 2024  Content Version: 14.5 2024-2025 [employer].   Care instructions adapted under license by your healthcare professional. If you have questions about a medical condition or this instruction, always ask your healthcare professional. [employer] disclaims any warranty or liability for your use of this information.    Learning About Depression Screening  What is depression screening?  Depression screening is a way to see if you have depression symptoms. It may be done by a doctor or counselor. It's often part of a routine checkup. That's because your mental health is just as important as your physical health.  Depression is a mental health condition that affects how you feel, think, and act. You may:  Have less energy.  Lose interest in your daily activities.  Feel sad and grouchy for a long time.  Depression is very common. It affects people of all ages.  Many things can lead to depression. Some people become depressed after they have a stroke or find out they have a major illness like cancer or heart disease. The death of a loved one or a breakup may lead to depression. It can run in families. Most experts believe that a combination of inherited genes and stressful life events can cause it.  What happens during screening?  You may be asked to fill out a form about your depression symptoms. You and the doctor will discuss your answers. The doctor may ask you more questions to learn more about how you think, act, and feel.  What happens after screening?  If you have symptoms of depression, your doctor will talk to you about your options.  Doctors usually treat depression with medicines or counseling. Often, combining the two works best. Many people don't get help because they think that they'll get over the depression on their own. But people with depression may not get better unless they get treatment.  The " "cause of depression is not well understood. There may be many factors involved. But if you have depression, it's not your fault.  A serious symptom of depression is thinking about death or suicide. If you or someone you care about talks about this or about feeling hopeless, get help right away.  It's important to know that depression can be treated. Medicine, counseling, and self-care may help.  Where can you learn more?  Go to https://www.AnybodyOutThere.net/patiented  Enter T185 in the search box to learn more about \"Learning About Depression Screening.\"  Current as of: July 31, 2024  Content Version: 14.5    [id number] [employer].   Care instructions adapted under license by your healthcare professional. If you have questions about a medical condition or this instruction, always ask your healthcare professional. [employer] disclaims any warranty or liability for your use of this information.             Signed Electronically by: JAIRO Charlton CNP    Answers submitted by the patient for this visit:  Patient Health Questionnaire (Submitted on 6/12/2025)  If you checked off any problems, how difficult have these problems made it for you to do your work, take care of things at home, or get along with other people?: Very difficult  PHQ9 TOTAL SCORE: 15    "

## 2025-06-16 ENCOUNTER — PATIENT OUTREACH (OUTPATIENT)
Dept: CARE COORDINATION | Facility: CLINIC | Age: 25
End: 2025-06-16
Payer: COMMERCIAL

## 2025-08-18 ENCOUNTER — LAB (OUTPATIENT)
Dept: LAB | Facility: CLINIC | Age: 25
End: 2025-08-18
Payer: COMMERCIAL

## 2025-08-18 ENCOUNTER — MEDICAL CORRESPONDENCE (OUTPATIENT)
Dept: HEALTH INFORMATION MANAGEMENT | Facility: CLINIC | Age: 25
End: 2025-08-18

## 2025-08-18 DIAGNOSIS — Z31.440 ENCOUNTER OF MALE FOR TESTING FOR GENETIC DISEASE CARRIER STATUS FOR PROCREATIVE MANAGEMENT: Primary | ICD-10-CM

## 2025-08-18 DIAGNOSIS — Z84.81 FAMILY HISTORY OF CARRIER OF GENETIC DISEASE: ICD-10-CM

## 2025-08-18 DIAGNOSIS — Z31.440 ENCOUNTER OF MALE FOR TESTING FOR GENETIC DISEASE CARRIER STATUS FOR PROCREATIVE MANAGEMENT: ICD-10-CM

## 2025-08-18 PROCEDURE — 36415 COLL VENOUS BLD VENIPUNCTURE: CPT

## 2025-08-28 ENCOUNTER — TELEPHONE (OUTPATIENT)
Dept: MATERNAL FETAL MEDICINE | Facility: CLINIC | Age: 25
End: 2025-08-28
Payer: COMMERCIAL